# Patient Record
Sex: FEMALE | Race: BLACK OR AFRICAN AMERICAN | NOT HISPANIC OR LATINO | Employment: FULL TIME | URBAN - METROPOLITAN AREA
[De-identification: names, ages, dates, MRNs, and addresses within clinical notes are randomized per-mention and may not be internally consistent; named-entity substitution may affect disease eponyms.]

---

## 2017-11-30 ENCOUNTER — HOSPITAL ENCOUNTER (EMERGENCY)
Facility: HOSPITAL | Age: 22
Discharge: HOME | End: 2017-11-30
Attending: EMERGENCY MEDICINE
Payer: OTHER GOVERNMENT

## 2017-11-30 VITALS
BODY MASS INDEX: 30.58 KG/M2 | OXYGEN SATURATION: 100 % | RESPIRATION RATE: 18 BRPM | SYSTOLIC BLOOD PRESSURE: 118 MMHG | DIASTOLIC BLOOD PRESSURE: 59 MMHG | HEART RATE: 67 BPM | TEMPERATURE: 98 F | HEIGHT: 61 IN | WEIGHT: 162 LBS

## 2017-11-30 DIAGNOSIS — N93.8 DYSFUNCTIONAL UTERINE BLEEDING: ICD-10-CM

## 2017-11-30 DIAGNOSIS — R51.9 NONINTRACTABLE HEADACHE, UNSPECIFIED CHRONICITY PATTERN, UNSPECIFIED HEADACHE TYPE: Primary | ICD-10-CM

## 2017-11-30 LAB
ALBUMIN SERPL BCP-MCNC: 3.6 G/DL
ALP SERPL-CCNC: 52 U/L
ALT SERPL W/O P-5'-P-CCNC: 13 U/L
ANION GAP SERPL CALC-SCNC: 6 MMOL/L
AST SERPL-CCNC: 20 U/L
B-HCG UR QL: NEGATIVE
BASOPHILS # BLD AUTO: 0.02 K/UL
BASOPHILS NFR BLD: 0.4 %
BILIRUB SERPL-MCNC: 0.6 MG/DL
BILIRUB UR QL STRIP: NEGATIVE
BUN SERPL-MCNC: 5 MG/DL
CALCIUM SERPL-MCNC: 9.3 MG/DL
CHLORIDE SERPL-SCNC: 109 MMOL/L
CLARITY UR: CLEAR
CO2 SERPL-SCNC: 26 MMOL/L
COLOR UR: ABNORMAL
CREAT SERPL-MCNC: 0.8 MG/DL
CTP QC/QA: YES
DIFFERENTIAL METHOD: ABNORMAL
EOSINOPHIL # BLD AUTO: 0.2 K/UL
EOSINOPHIL NFR BLD: 3.8 %
ERYTHROCYTE [DISTWIDTH] IN BLOOD BY AUTOMATED COUNT: 12.5 %
EST. GFR  (AFRICAN AMERICAN): >60 ML/MIN/1.73 M^2
EST. GFR  (NON AFRICAN AMERICAN): >60 ML/MIN/1.73 M^2
GLUCOSE SERPL-MCNC: 98 MG/DL
GLUCOSE UR QL STRIP: NEGATIVE
HCT VFR BLD AUTO: 28.1 %
HGB BLD-MCNC: 9.7 G/DL
HGB UR QL STRIP: ABNORMAL
KETONES UR QL STRIP: NEGATIVE
LEUKOCYTE ESTERASE UR QL STRIP: NEGATIVE
LYMPHOCYTES # BLD AUTO: 1.7 K/UL
LYMPHOCYTES NFR BLD: 33.7 %
MCH RBC QN AUTO: 31.6 PG
MCHC RBC AUTO-ENTMCNC: 34.5 G/DL
MCV RBC AUTO: 92 FL
MICROSCOPIC COMMENT: ABNORMAL
MONOCYTES # BLD AUTO: 0.3 K/UL
MONOCYTES NFR BLD: 6.8 %
NEUTROPHILS # BLD AUTO: 2.8 K/UL
NEUTROPHILS NFR BLD: 55.3 %
NITRITE UR QL STRIP: NEGATIVE
PH UR STRIP: 8 [PH] (ref 5–8)
PLATELET # BLD AUTO: 230 K/UL
PMV BLD AUTO: 9.4 FL
POTASSIUM SERPL-SCNC: 4.4 MMOL/L
PROT SERPL-MCNC: 6.6 G/DL
PROT UR QL STRIP: NEGATIVE
RBC # BLD AUTO: 3.07 M/UL
RBC #/AREA URNS HPF: 12 /HPF (ref 0–4)
SODIUM SERPL-SCNC: 141 MMOL/L
SP GR UR STRIP: 1 (ref 1–1.03)
SQUAMOUS #/AREA URNS HPF: 1 /HPF
URN SPEC COLLECT METH UR: ABNORMAL
UROBILINOGEN UR STRIP-ACNC: NEGATIVE EU/DL
WBC # BLD AUTO: 4.98 K/UL

## 2017-11-30 PROCEDURE — 81025 URINE PREGNANCY TEST: CPT | Performed by: EMERGENCY MEDICINE

## 2017-11-30 PROCEDURE — 99284 EMERGENCY DEPT VISIT MOD MDM: CPT

## 2017-11-30 PROCEDURE — 81000 URINALYSIS NONAUTO W/SCOPE: CPT

## 2017-11-30 PROCEDURE — 80053 COMPREHEN METABOLIC PANEL: CPT

## 2017-11-30 PROCEDURE — 85025 COMPLETE CBC W/AUTO DIFF WBC: CPT

## 2017-11-30 RX ORDER — BUTALBITAL, ACETAMINOPHEN AND CAFFEINE 50; 325; 40 MG/1; MG/1; MG/1
1 TABLET ORAL EVERY 6 HOURS PRN
Qty: 12 TABLET | Refills: 0 | Status: SHIPPED | OUTPATIENT
Start: 2017-11-30 | End: 2017-12-30

## 2017-11-30 NOTE — ED TRIAGE NOTES
Reports vag bleeding since 11/16/17. C/o lt. Eye pain, nausea  X 4 day. Reports blurred vision.  Tylenol 500mg taken 12 noon with no relief.

## 2017-12-01 NOTE — ED PROVIDER NOTES
Encounter Date: 11/30/2017       History     Chief Complaint   Patient presents with    Headache     behind left eye - throbbing in nature.     LMP started 11/16 remains present and heavy.  +nauseated x 1 week.     Chief complaint: Headache and vaginal bleeding    History of present illness: Patient is 22-year-old female who reports pain in her head behind her left eye that is intermittent and throbbing.  She states the Tylenol ES was not effective for this pain.  Worse in the morning current severity pain is 9/10.  She reports associated nausea.  Also states that her LMP was 11/16/17 she reports continuous vaginal bleeding since then.  He reports approximately 2 pads bled through today.      The history is provided by the patient. No  was used.     Review of patient's allergies indicates:  No Known Allergies  History reviewed. No pertinent past medical history.  Past Surgical History:   Procedure Laterality Date    KELOID EXCISION       History reviewed. No pertinent family history.  Social History   Substance Use Topics    Smoking status: Never Smoker    Smokeless tobacco: Never Used    Alcohol use Yes      Comment: occ     Review of Systems   Constitutional: Negative for chills, fatigue and fever.   HENT: Negative for congestion, ear discharge, ear pain, postnasal drip, rhinorrhea, sinus pressure, sneezing, sore throat and voice change.    Eyes: Negative for discharge and itching.   Respiratory: Negative for cough, shortness of breath and wheezing.    Cardiovascular: Negative for chest pain, palpitations and leg swelling.   Gastrointestinal: Negative for abdominal pain, constipation, diarrhea, nausea and vomiting.   Endocrine: Negative for polydipsia, polyphagia and polyuria.   Genitourinary: Positive for vaginal bleeding. Negative for dysuria, frequency, hematuria, urgency, vaginal discharge and vaginal pain.   Musculoskeletal: Negative for arthralgias and myalgias.   Skin: Negative for  rash and wound.   Neurological: Positive for headaches. Negative for dizziness, seizures, syncope, weakness and numbness.        Photo/phonophobia   Hematological: Negative for adenopathy. Does not bruise/bleed easily.   Psychiatric/Behavioral: Negative for self-injury and suicidal ideas. The patient is not nervous/anxious.        Physical Exam     Initial Vitals [11/30/17 1528]   BP Pulse Resp Temp SpO2   118/68 83 16 98.4 °F (36.9 °C) 100 %      MAP       84.67         Physical Exam    Nursing note and vitals reviewed.  Constitutional: She appears well-developed and well-nourished.   HENT:   Head: Normocephalic and atraumatic.   Right Ear: External ear normal.   Left Ear: External ear normal.   Nose: Nose normal. No epistaxis.   Mouth/Throat: Oropharynx is clear and moist.   Eyes: Conjunctivae and EOM are normal. Pupils are equal, round, and reactive to light. Right eye exhibits no discharge. Left eye exhibits no discharge.   Neck: Normal range of motion.   Abdominal: Soft. Normal appearance and bowel sounds are normal. She exhibits no distension. There is no tenderness.   Musculoskeletal: Normal range of motion.   Neurological: She is alert and oriented to person, place, and time. She has normal strength. No cranial nerve deficit or sensory deficit. She exhibits normal muscle tone. She displays a negative Romberg sign. Coordination and gait normal. GCS eye subscore is 4. GCS verbal subscore is 5. GCS motor subscore is 6.   Equal  strength bilaterally, equal bicep flexion and tricep extension strength, leg extension and flexion strength appropriate and equal, foot plantar- and dorsi-flexion equal and appropriate   Skin: Skin is dry. Capillary refill takes less than 2 seconds.         ED Course   Procedures  Labs Reviewed   CBC W/ AUTO DIFFERENTIAL - Abnormal; Notable for the following:        Result Value    RBC 3.07 (*)     Hemoglobin 9.7 (*)     Hematocrit 28.1 (*)     MCH 31.6 (*)     All other components  within normal limits   COMPREHENSIVE METABOLIC PANEL - Abnormal; Notable for the following:     BUN, Bld 5 (*)     Alkaline Phosphatase 52 (*)     Anion Gap 6 (*)     All other components within normal limits   URINALYSIS - Abnormal; Notable for the following:     Occult Blood UA 2+ (*)     All other components within normal limits   URINALYSIS MICROSCOPIC - Abnormal; Notable for the following:     RBC, UA 12 (*)     All other components within normal limits   POCT URINE PREGNANCY             Medical Decision Making:   Differential Diagnosis:   Differential diagnosis includes but is not limited to meningitis, encephalitis, herpes zoster, subarachnoid hemorrhage, venous sinus thrombosis, glaucoma, migraine, tension headache, trigeminal neuralgia, TMJ pain.  There was no nuchal rigidity to suggest meningitis, encephalitis.  No rashes suggest herpes zoster.  CT was negative for all edge cranial abnormality including subarachnoid hemorrhage.  There was no trauma to suggest venous sinus thrombosis.  No visual disturbances to suggest glaucoma.  Headache did not fit the profile of a tension headache.  Facial pain was not present so I doubt trigeminal neuralgia.  There is no pain with opening or closing the muscle doubt TMJ pain.                   ED Course as of Nov 30 1936   Thu Nov 30, 2017   1636 Preg Test, Ur: Negative [VC]   1636 BP: 118/68 [VC]   1636 Temp: 98.4 °F (36.9 °C) [VC]   1636 Pulse: 83 [VC]   1636 Resp: 16 [VC]   1636 Triage note reviewed.  VS normal.   SpO2: 100 % [VC]   1636 Initial assessment: 22-year-old female complains of headache behind the left eye that is intermittent and throbbing.  Tylenol does not affect his headache is worse in the morning current severity pain is 9/10.  She has associated nausea without vomiting, reported photophobia and phonophobia.  Patient also reports uterine bleeding since her period began on November 16, 2017.  She reports bleeding through 2 pads today.Plan: CBC,  chemistry, urinalysis, CT of the head, UPT.Plan discussed with Dr. Garber who concurs.  [VC]   1639 Awaiting intake exam by nurse.  [VC]   1747 CBC: leukocyte count was normal, the H&H was reduced. The platelet count was normal. This indicates anemia    [VC]   1758 Occult blood in urine is due to patient's vaginal bleeding.  [VC]   1826 The chemistry was negative for hypo-or hyper natremia, kalemia, chloridemia, or other electrolyte abnormalities; BUN and creatinine were within normal limits indicating normal kidney function, ALT and AST were within normal limits indicating normal liver function, there was no transaminitis.    [VC]   1826 No acute intracranial abnormality identified.  Specifically, no intracranial hemorrhage.  [VC]   1826 Pt will be d/c'ed to home with rx for fioricet and instructions to f/u with neuro and gyn asap.  [VC]      ED Course User Index  [VC] Rupesh Ho DNP     Clinical Impression:   The primary encounter diagnosis was Nonintractable headache, unspecified chronicity pattern, unspecified headache type. A diagnosis of Dysfunctional uterine bleeding was also pertinent to this visit.    Disposition:   Disposition: Discharged  Condition: Stable                        Rupesh Ho DNP  11/30/17 1939

## 2017-12-01 NOTE — DISCHARGE INSTRUCTIONS
You have been given a medication that may cause drowsiness, do not drive or operate heavy machinery with this medication.  Return to the Emergency department for any worsening or failure to improve, otherwise follow up with your primary care provider.

## 2017-12-05 ENCOUNTER — OFFICE VISIT (OUTPATIENT)
Dept: OBSTETRICS AND GYNECOLOGY | Facility: CLINIC | Age: 22
End: 2017-12-05
Payer: OTHER GOVERNMENT

## 2017-12-05 ENCOUNTER — LAB VISIT (OUTPATIENT)
Dept: LAB | Facility: OTHER | Age: 22
End: 2017-12-05
Attending: OBSTETRICS & GYNECOLOGY
Payer: OTHER GOVERNMENT

## 2017-12-05 VITALS
SYSTOLIC BLOOD PRESSURE: 100 MMHG | WEIGHT: 167.56 LBS | HEIGHT: 61 IN | DIASTOLIC BLOOD PRESSURE: 62 MMHG | BODY MASS INDEX: 31.63 KG/M2

## 2017-12-05 DIAGNOSIS — N93.8 DUB (DYSFUNCTIONAL UTERINE BLEEDING): ICD-10-CM

## 2017-12-05 DIAGNOSIS — Z01.411 ENCOUNTER FOR GYNECOLOGICAL EXAMINATION WITH ABNORMAL FINDING: ICD-10-CM

## 2017-12-05 DIAGNOSIS — Z01.419 PAP SMEAR, AS PART OF ROUTINE GYNECOLOGICAL EXAMINATION: ICD-10-CM

## 2017-12-05 DIAGNOSIS — Z01.411 ENCOUNTER FOR GYNECOLOGICAL EXAMINATION WITH ABNORMAL FINDING: Primary | ICD-10-CM

## 2017-12-05 LAB
B-HCG UR QL: NEGATIVE
CTP QC/QA: YES
TSH SERPL DL<=0.005 MIU/L-ACNC: 0.5 UIU/ML

## 2017-12-05 PROCEDURE — 36415 COLL VENOUS BLD VENIPUNCTURE: CPT

## 2017-12-05 PROCEDURE — 81025 URINE PREGNANCY TEST: CPT | Mod: PBBFAC | Performed by: OBSTETRICS & GYNECOLOGY

## 2017-12-05 PROCEDURE — 99212 OFFICE O/P EST SF 10 MIN: CPT | Mod: PBBFAC | Performed by: OBSTETRICS & GYNECOLOGY

## 2017-12-05 PROCEDURE — 84443 ASSAY THYROID STIM HORMONE: CPT

## 2017-12-05 PROCEDURE — 99385 PREV VISIT NEW AGE 18-39: CPT | Mod: S$PBB,,, | Performed by: OBSTETRICS & GYNECOLOGY

## 2017-12-05 PROCEDURE — 88175 CYTOPATH C/V AUTO FLUID REDO: CPT | Performed by: PATHOLOGY

## 2017-12-05 PROCEDURE — 99999 PR PBB SHADOW E&M-EST. PATIENT-LVL II: CPT | Mod: PBBFAC,,, | Performed by: OBSTETRICS & GYNECOLOGY

## 2017-12-05 PROCEDURE — 88141 CYTOPATH C/V INTERPRET: CPT | Mod: ,,, | Performed by: PATHOLOGY

## 2017-12-05 NOTE — LETTER
December 8, 2017      Michelle Haddad, PARTHA  [address] [address]           [facility] [address]  [address] [facility] [address]  Phone: [phone]          Patient: Serge Rodriguez   MR Number: [id number]   YOB: 1995   Date of Visit: 12/5/2017       Dear Michelle Haddad:    Thank you for referring Serge Rodriguez to me for evaluation. Attached you will find relevant portions of my assessment and plan of care.    If you have questions, please do not hesitate to call me. I look forward to following Serge Rodriguez along with you.    Sincerely,    Emmett Gamble MD    Enclosure  CC:  No Recipients    If you would like to receive this communication electronically, please contact [email or web address] or [phone] to request more information on [employer] access.    For providers and/or their staff who would like to refer a patient to [facility], please contact us through our one-stop-shop provider referral line, [facility]ager, at [phone].    If you feel you have received this communication in error or would no longer like to receive these types of communications, please e-mail [email or web address]

## 2017-12-08 NOTE — PROGRESS NOTES
Subjective:       Patient ID: Serge Rodriguez is a 22 y.o. female.    Chief Complaint:  Metrorrhagia (Patient reports no period x 3 month.) and Migraine      History of Present Illness  HPI  Pt is 22 y.o. with Patient's last menstrual period was 2017 (exact date). who reports that for the last 3 months, her cycles have been irregular.  Also having migraines.  Not exactly related to her menses.  Does not want to be on contraceptive at this time.    GYN & OB History  Patient's last menstrual period was 2017 (exact date).   Date of Last Pap: 2017    OB History    Para Term  AB Living   1       1     SAB TAB Ectopic Multiple Live Births                  # Outcome Date GA Lbr Andrez/2nd Weight Sex Delivery Anes PTL Lv   1 AB  20w0d       FD          Review of Systems  Review of Systems   Constitutional: Negative for activity change, appetite change and fatigue.   HENT: Negative.  Negative for tinnitus.    Eyes: Negative.    Respiratory: Negative for cough and shortness of breath.    Cardiovascular: Negative for chest pain and palpitations.   Gastrointestinal: Negative.  Negative for abdominal pain, blood in stool, constipation, diarrhea and nausea.   Endocrine: Negative.  Negative for hot flashes.   Genitourinary: Negative for dyspareunia, dysuria, frequency, menstrual problem, pelvic pain, vaginal discharge, dysmenorrhea, urinary incontinence and postcoital bleeding.   Musculoskeletal: Negative for back pain and joint swelling.   Skin:  Negative for rash.   Neurological: Negative.  Negative for headaches.   Hematological: Negative.  Does not bruise/bleed easily.   Psychiatric/Behavioral: The patient is not nervous/anxious.    Breast: negative.  Negative for breast mass, nipple discharge and skin changes          Objective:    Physical Exam:   Constitutional: Vital signs are normal. She appears well-developed and well-nourished. She is active and cooperative. She does not appear ill. No  distress.    HENT:   Head: Normocephalic and atraumatic.   Nose: Nose normal.   Mouth/Throat: Oropharynx is clear and moist and mucous membranes are normal.    Eyes: Conjunctivae, EOM and lids are normal. Pupils are equal, round, and reactive to light.    Neck: Full passive range of motion without pain. No muscular tenderness present. No neck rigidity. No thyroid mass and no thyromegaly present.    Cardiovascular: Normal rate, regular rhythm, S1 normal, S2 normal, normal heart sounds and normal pulses.     Pulmonary/Chest: Effort normal and breath sounds normal. No accessory muscle usage. Right breast exhibits no inverted nipple, no mass, no nipple discharge, no skin change, no tenderness and no swelling. Left breast exhibits no inverted nipple, no mass, no nipple discharge, no skin change, no tenderness and no swelling.        Abdominal: Soft. Normal appearance and bowel sounds are normal. She exhibits no distension, no ascites and no mass. There is no hepatosplenomegaly. There is no tenderness. There is no rigidity, no rebound and no guarding.     Genitourinary: Vagina normal and uterus normal. Pelvic exam was performed with patient supine. There is no tenderness or injury on the right labia. There is no tenderness or injury on the left labia. Uterus is not deviated, not hosting fibroids and not experiencing uterine prolapse. Cervix is normal. Right adnexum displays no mass and no fullness. Left adnexum displays no mass and no fullness. No erythema, rectocele or cystocele in the vagina. No vaginal discharge found. Labial bartholins normal.Cervix exhibits no motion tenderness and no discharge.              Lymphadenopathy:        Right: No inguinal adenopathy present.        Left: No inguinal adenopathy present.    Neurological: She is alert. She has normal strength.    Skin: Skin is warm, dry and intact.    Psychiatric: She has a normal mood and affect. Her behavior is normal. Thought content normal. Her mood  appears not anxious. Her affect is not inappropriate. Her speech is not slurred. She is not agitated and not aggressive. Thought content is not paranoid. Cognition and memory are normal. She expresses no suicidal plans and no homicidal plans.          Assessment:        1. Encounter for gynecological examination with abnormal finding    2. Pap smear, as part of routine gynecological examination    3. DUB (dysfunctional uterine bleeding)                Plan:      Tashea was seen today for metrorrhagia and migraine.    Diagnoses and all orders for this visit:    Encounter for gynecological examination with abnormal finding  -     POCT urine pregnancy  -     Liquid-based pap smear, screening  -     TSH; Future    Pap smear, as part of routine gynecological examination  -     Liquid-based pap smear, screening  Likely AUB-O.    DUB (dysfunctional uterine bleeding)  -     POCT urine pregnancy  -     TSH; Future

## 2017-12-12 ENCOUNTER — TELEPHONE (OUTPATIENT)
Dept: OBSTETRICS AND GYNECOLOGY | Facility: HOSPITAL | Age: 22
End: 2017-12-12

## 2017-12-12 NOTE — TELEPHONE ENCOUNTER
Patient was notified of results and states she is still bleeding at this time and will schedule follow up through [facility] at a later time.

## 2018-03-22 ENCOUNTER — OFFICE VISIT (OUTPATIENT)
Dept: OBSTETRICS AND GYNECOLOGY | Facility: CLINIC | Age: 23
End: 2018-03-22
Payer: OTHER GOVERNMENT

## 2018-03-22 VITALS
BODY MASS INDEX: 30.93 KG/M2 | DIASTOLIC BLOOD PRESSURE: 62 MMHG | SYSTOLIC BLOOD PRESSURE: 116 MMHG | WEIGHT: 163.81 LBS | HEIGHT: 61 IN

## 2018-03-22 DIAGNOSIS — N91.4 SECONDARY OLIGOMENORRHEA: Primary | ICD-10-CM

## 2018-03-22 DIAGNOSIS — R87.615 UNSATISFACTORY CERVICAL PAPANICOLAOU SMEAR: ICD-10-CM

## 2018-03-22 PROCEDURE — 87624 HPV HI-RISK TYP POOLED RSLT: CPT

## 2018-03-22 PROCEDURE — 99213 OFFICE O/P EST LOW 20 MIN: CPT | Mod: S$PBB,,, | Performed by: OBSTETRICS & GYNECOLOGY

## 2018-03-22 PROCEDURE — 99999 PR PBB SHADOW E&M-EST. PATIENT-LVL II: CPT | Mod: PBBFAC,,, | Performed by: OBSTETRICS & GYNECOLOGY

## 2018-03-22 PROCEDURE — 99212 OFFICE O/P EST SF 10 MIN: CPT | Mod: PBBFAC | Performed by: OBSTETRICS & GYNECOLOGY

## 2018-03-22 PROCEDURE — 88175 CYTOPATH C/V AUTO FLUID REDO: CPT | Performed by: PATHOLOGY

## 2018-03-22 PROCEDURE — 88141 CYTOPATH C/V INTERPRET: CPT | Mod: ,,, | Performed by: PATHOLOGY

## 2018-03-22 NOTE — PROGRESS NOTES
Subjective:       Patient ID: Serge Loera is a 23 y.o. female.    Chief Complaint:  Abnormal Pap Smear      History of Present Illness  HPI  Pt is 23 y.o. here for 2 issues:  1)  Unsatisfactory pap -- last pap was unsat.  No hx of abnl pap  2)  Oligomenorrhea -- no tx started at last visit.  Nl tsh.  Still has irregular cycles.  Is sexually active and does not need contraception.    GYN & OB History  Patient's last menstrual period was 2018 (approximate).   Date of Last Pap: 2017    OB History    Para Term  AB Living   1       1     SAB TAB Ectopic Multiple Live Births                  # Outcome Date GA Lbr Andrez/2nd Weight Sex Delivery Anes PTL Lv   1 AB  20w0d       FD          Review of Systems  Review of Systems   Constitutional: Negative for activity change, appetite change and fatigue.   HENT: Negative.  Negative for tinnitus.    Eyes: Negative.    Respiratory: Negative for cough and shortness of breath.    Cardiovascular: Negative for chest pain and palpitations.   Gastrointestinal: Negative.  Negative for abdominal pain, blood in stool, constipation, diarrhea and nausea.   Endocrine: Negative.  Negative for hot flashes.   Genitourinary: Positive for menstrual problem. Negative for dyspareunia, dysuria, frequency, pelvic pain, vaginal discharge, dysmenorrhea, urinary incontinence and postcoital bleeding.   Musculoskeletal: Negative for back pain and joint swelling.   Skin:  Negative for rash.   Neurological: Negative.  Negative for headaches.   Hematological: Negative.  Does not bruise/bleed easily.   Psychiatric/Behavioral: The patient is not nervous/anxious.    Breast: negative.  Negative for breast mass, nipple discharge and skin changes          Objective:    Physical Exam:   Constitutional: She is oriented to person, place, and time. She appears well-developed and well-nourished. No distress.    HENT:   Head: Normocephalic and atraumatic.    Eyes: Conjunctivae and lids  are normal. Pupils are equal, round, and reactive to light.    Neck: Normal range of motion and full passive range of motion without pain. Neck supple.    Cardiovascular: Normal rate and regular rhythm.  Exam reveals no edema.     Pulmonary/Chest: Effort normal and breath sounds normal. She has no wheezes.        Abdominal: Soft. Normal appearance and bowel sounds are normal. She exhibits no distension. There is no tenderness. There is no rebound and no guarding.     Genitourinary: Vagina normal and uterus normal. Pelvic exam was performed with patient supine. There is no tenderness or lesion on the right labia. There is no tenderness or lesion on the left labia. Uterus is not deviated, not fixed and not hosting fibroids. Cervix is normal. Right adnexum displays no mass and no tenderness. Left adnexum displays no mass and no tenderness. No rectocele, cystocele or unspecified prolapse of vaginal walls in the vagina. No vaginal discharge found. Labial bartholins normal.Cervix exhibits no motion tenderness and no discharge.           Musculoskeletal: Normal range of motion and moves all extremeties.       Neurological: She is alert and oriented to person, place, and time. She has normal strength.    Skin: Skin is warm and dry.    Psychiatric: She has a normal mood and affect. Her speech is normal and behavior is normal. Thought content normal. Her mood appears not anxious. She does not exhibit a depressed mood. She expresses no suicidal plans and no homicidal plans.          Assessment:        1. Secondary oligomenorrhea    2. Unsatisfactory cervical Papanicolaou smear                Plan:      Serge was seen today for abnormal pap smear.    Diagnoses and all orders for this visit:    Secondary oligomenorrhea  -     Liquid-based pap smear, screening  Discussed the different reasons for oligomenorrhea.  She was likely amenorrhea.  We discussed the importance of exercise and diet to help maintain weight.  Offered other  pills and patient declines at this time.  Unsatisfactory cervical Papanicolaou smear  -     Liquid-based pap smear, screening  Cervix grossly normal.  Adequate sample obtained.

## 2018-04-02 ENCOUNTER — TELEPHONE (OUTPATIENT)
Dept: OBSTETRICS AND GYNECOLOGY | Facility: CLINIC | Age: 23
End: 2018-04-02

## 2018-04-02 NOTE — TELEPHONE ENCOUNTER
----- Message from Maikel Alan sent at 4/2/2018 12:05 PM CDT -----  Contact: Pt  Name of Who is Calling: GREGORY NELSON [[id number]]      What is the request in detail: Patient would Iike to speak with someone to explain her test results      Can the clinic reply by [facility]: yes      [facility]: [phone]

## 2018-04-02 NOTE — TELEPHONE ENCOUNTER
Pt was calling regarding her pap smear test results.  Pt was given her results Per Shweta Pham:    The results of your most recent Pap smear was ASCUS (mild abnormal cells). We recommend that you come back in 1 year for your annual exam and pap smear.    Pt stated she will be moving to [place] by next year.  Pt was instructed when she moves to select a new GYN, and to have her records transferred to the New  Office.    Pt verbalized understanding and thanked me for call.

## 2018-04-03 LAB
HPV16 AG SPEC QL: NEGATIVE
HPV16+18+H RISK 12 DNA CVX-IMP: POSITIVE
HPV18 DNA SPEC QL NAA+PROBE: NEGATIVE

## 2018-05-22 ENCOUNTER — TELEPHONE (OUTPATIENT)
Dept: OBSTETRICS AND GYNECOLOGY | Facility: CLINIC | Age: 23
End: 2018-05-22

## 2018-05-22 DIAGNOSIS — Z34.90 PREGNANCY, UNSPECIFIED GESTATIONAL AGE: Primary | ICD-10-CM

## 2018-05-22 NOTE — TELEPHONE ENCOUNTER
Spoke with Pt  Scheduled pt's amenorrhea appt with Dr Gamble and dating U/S to follow.    Pt verbalized understanding and thanked me for call.

## 2018-05-22 NOTE — TELEPHONE ENCOUNTER
----- Message from Betty Hillman sent at 5/22/2018 11:13 AM CDT -----  Contact: pt      Can the clinic reply in [facility]: yes    Who Called: GREGORY NELSON [[id number]]    Date of Positive Preg Test: 5/21    1st day of Last Menstrual Cycle: 3/29    List Any Difficulties: no      What Number to Call Back: [phone]

## 2018-05-22 NOTE — TELEPHONE ENCOUNTER
----- Message from Arely Garcia sent at 5/22/2018  3:43 PM CDT -----  Contact: Serge   Name of Who is Calling: Serge      What is the request in detail: Patient was returning a call back to schedule her initial ob appointment       Can the clinic reply by [facility]: No       What Number to Call Back if not in [facility]: [phone]

## 2018-05-30 ENCOUNTER — TELEPHONE (OUTPATIENT)
Dept: OBSTETRICS AND GYNECOLOGY | Facility: CLINIC | Age: 23
End: 2018-05-30

## 2018-05-30 NOTE — TELEPHONE ENCOUNTER
Spoke with pt;    Pt is experiencing very bad morning sickness and would like to come in sooner than next week to see Dr. Gamble.    Instructed pt to eat crackers, avoid sudden movements, eat small frequent meals, and stay hydrated using water, sprite, or ginger ale.  Taking small sips if necessary.    Pt verblized understanding and thanked me for changing her appt.    Pt was rescheduled to see Dr. Gamble tomorrow at 8:30 am.

## 2018-05-30 NOTE — TELEPHONE ENCOUNTER
----- Message from Marisela Colin sent at 5/30/2018 12:03 PM CDT -----  Contact: GREGORY NELSON [[id number]]            Name of Who is Calling:GREGORY NELSON [[id number]]      What is the request in detail: pt would like to know if she can get her appointment moved up sooner due to her morning sickness      Can the clinic reply by [facility]: NO      What Number to Call Back if not in [facility]:[phone]

## 2018-05-31 ENCOUNTER — OFFICE VISIT (OUTPATIENT)
Dept: OBSTETRICS AND GYNECOLOGY | Facility: CLINIC | Age: 23
End: 2018-05-31
Payer: OTHER GOVERNMENT

## 2018-05-31 ENCOUNTER — LAB VISIT (OUTPATIENT)
Dept: LAB | Facility: OTHER | Age: 23
End: 2018-05-31
Attending: OBSTETRICS & GYNECOLOGY
Payer: OTHER GOVERNMENT

## 2018-05-31 VITALS
DIASTOLIC BLOOD PRESSURE: 60 MMHG | HEIGHT: 61 IN | BODY MASS INDEX: 28.72 KG/M2 | WEIGHT: 152.13 LBS | SYSTOLIC BLOOD PRESSURE: 100 MMHG

## 2018-05-31 DIAGNOSIS — N91.2 AMENORRHEA: ICD-10-CM

## 2018-05-31 DIAGNOSIS — R11.0 NAUSEA: ICD-10-CM

## 2018-05-31 DIAGNOSIS — Z34.90 PREGNANCY, UNSPECIFIED GESTATIONAL AGE: ICD-10-CM

## 2018-05-31 DIAGNOSIS — N91.2 AMENORRHEA: Primary | ICD-10-CM

## 2018-05-31 DIAGNOSIS — O09.891 HISTORY OF PRETERM DELIVERY, CURRENTLY PREGNANT IN FIRST TRIMESTER: ICD-10-CM

## 2018-05-31 LAB
ABO + RH BLD: NORMAL
B-HCG UR QL: NEGATIVE
BASOPHILS # BLD AUTO: 0.01 K/UL
BASOPHILS NFR BLD: 0.2 %
BLD GP AB SCN CELLS X3 SERPL QL: NORMAL
C TRACH DNA SPEC QL NAA+PROBE: NOT DETECTED
CTP QC/QA: YES
DIFFERENTIAL METHOD: ABNORMAL
EOSINOPHIL # BLD AUTO: 0.1 K/UL
EOSINOPHIL NFR BLD: 0.9 %
ERYTHROCYTE [DISTWIDTH] IN BLOOD BY AUTOMATED COUNT: 18.3 %
HCT VFR BLD AUTO: 33.4 %
HGB BLD-MCNC: 10.4 G/DL
LYMPHOCYTES # BLD AUTO: 1.3 K/UL
LYMPHOCYTES NFR BLD: 22.5 %
MCH RBC QN AUTO: 25.6 PG
MCHC RBC AUTO-ENTMCNC: 31.1 G/DL
MCV RBC AUTO: 82 FL
MONOCYTES # BLD AUTO: 0.4 K/UL
MONOCYTES NFR BLD: 7 %
N GONORRHOEA DNA SPEC QL NAA+PROBE: NOT DETECTED
NEUTROPHILS # BLD AUTO: 3.9 K/UL
NEUTROPHILS NFR BLD: 69.2 %
PLATELET # BLD AUTO: 311 K/UL
PMV BLD AUTO: 9.6 FL
RBC # BLD AUTO: 4.07 M/UL
RPR SER QL: NORMAL
WBC # BLD AUTO: 5.69 K/UL

## 2018-05-31 PROCEDURE — 76801 OB US < 14 WKS SINGLE FETUS: CPT | Mod: 26,S$PBB,, | Performed by: OBSTETRICS & GYNECOLOGY

## 2018-05-31 PROCEDURE — 87086 URINE CULTURE/COLONY COUNT: CPT

## 2018-05-31 PROCEDURE — 99214 OFFICE O/P EST MOD 30 MIN: CPT | Mod: S$PBB,,, | Performed by: OBSTETRICS & GYNECOLOGY

## 2018-05-31 PROCEDURE — 86850 RBC ANTIBODY SCREEN: CPT

## 2018-05-31 PROCEDURE — 87340 HEPATITIS B SURFACE AG IA: CPT

## 2018-05-31 PROCEDURE — 36415 COLL VENOUS BLD VENIPUNCTURE: CPT

## 2018-05-31 PROCEDURE — 86592 SYPHILIS TEST NON-TREP QUAL: CPT

## 2018-05-31 PROCEDURE — 85025 COMPLETE CBC W/AUTO DIFF WBC: CPT

## 2018-05-31 PROCEDURE — 86703 HIV-1/HIV-2 1 RESULT ANTBDY: CPT

## 2018-05-31 PROCEDURE — 86762 RUBELLA ANTIBODY: CPT

## 2018-05-31 PROCEDURE — 76801 OB US < 14 WKS SINGLE FETUS: CPT | Mod: PBBFAC | Performed by: OBSTETRICS & GYNECOLOGY

## 2018-05-31 PROCEDURE — 99999 PR PBB SHADOW E&M-EST. PATIENT-LVL III: CPT | Mod: PBBFAC,,, | Performed by: OBSTETRICS & GYNECOLOGY

## 2018-05-31 PROCEDURE — 83020 HEMOGLOBIN ELECTROPHORESIS: CPT

## 2018-05-31 PROCEDURE — 81025 URINE PREGNANCY TEST: CPT | Mod: PBBFAC | Performed by: OBSTETRICS & GYNECOLOGY

## 2018-05-31 PROCEDURE — 87491 CHLMYD TRACH DNA AMP PROBE: CPT

## 2018-05-31 PROCEDURE — 99213 OFFICE O/P EST LOW 20 MIN: CPT | Mod: PBBFAC,25 | Performed by: OBSTETRICS & GYNECOLOGY

## 2018-06-01 LAB
BACTERIA UR CULT: NO GROWTH
HBV SURFACE AG SERPL QL IA: NEGATIVE
HGB A2 MFR BLD HPLC: 2 %
HGB FRACT BLD ELPH-IMP: ABNORMAL
HGB FRACT BLD ELPH-IMP: NORMAL
HIV 1+2 AB+HIV1 P24 AG SERPL QL IA: NEGATIVE
RUBV IGG SER-ACNC: 27.9 IU/ML
RUBV IGG SER-IMP: REACTIVE

## 2018-06-04 ENCOUNTER — TELEPHONE (OUTPATIENT)
Dept: OBSTETRICS AND GYNECOLOGY | Facility: CLINIC | Age: 23
End: 2018-06-04

## 2018-06-04 NOTE — PROGRESS NOTES
Subjective:       Patient ID: Serge Loera is a 23 y.o. female.    Chief Complaint:  Amenorrhea (EGA: 9 weeks) and Morning Sickness      History of Present Illness  HPI  Missed Menses/ Possible Pregnancy  Patient complains of amenorrhea. She believes she could be pregnant. Pregnancy is desired. Sexual Activity: single partner, contraception: none. Current symptoms also include: morning sickness. Last period was normal.     Patient's last menstrual period was 2018 (exact date).     GYN & OB History  Patient's last menstrual period was 2018 (exact date).   Date of Last Pap: 3/29/2018    OB History    Para Term  AB Living   1       1     SAB TAB Ectopic Multiple Live Births                  # Outcome Date GA Lbr Andrez/2nd Weight Sex Delivery Anes PTL Lv   1 AB 11/04/15 20w0d   M   Y FD          Review of Systems  Review of Systems   Constitutional: Negative for activity change, appetite change and fatigue.   HENT: Negative.  Negative for tinnitus.    Eyes: Negative.    Respiratory: Negative for cough and shortness of breath.    Cardiovascular: Negative for chest pain and palpitations.   Gastrointestinal: Positive for nausea. Negative for abdominal pain, blood in stool, constipation and diarrhea.   Endocrine: Negative.  Negative for hot flashes.   Genitourinary: Negative for dyspareunia, dysuria, frequency, menstrual problem, pelvic pain, vaginal discharge, dysmenorrhea, urinary incontinence and postcoital bleeding.   Musculoskeletal: Negative for back pain and joint swelling.   Skin:  Negative for rash.   Neurological: Negative.  Negative for headaches.   Hematological: Negative.  Does not bruise/bleed easily.   Psychiatric/Behavioral: The patient is not nervous/anxious.    Breast: negative.  Negative for breast mass, nipple discharge and skin changes          Objective:    Physical Exam:   Constitutional: She is oriented to person, place, and time. She appears well-developed and  well-nourished. No distress.    HENT:   Head: Normocephalic and atraumatic.    Eyes: Conjunctivae and lids are normal. Pupils are equal, round, and reactive to light.    Neck: Normal range of motion and full passive range of motion without pain. Neck supple.    Cardiovascular: Normal rate and regular rhythm.  Exam reveals no edema.     Pulmonary/Chest: Effort normal and breath sounds normal. She has no wheezes.        Abdominal: Soft. Normal appearance and bowel sounds are normal. She exhibits no distension. There is no tenderness. There is no rebound and no guarding.     Genitourinary: Vagina normal and uterus normal. Pelvic exam was performed with patient supine. There is no tenderness or lesion on the right labia. There is no tenderness or lesion on the left labia. Uterus is not deviated, not fixed and not hosting fibroids. Cervix is normal. Right adnexum displays no mass and no tenderness. Left adnexum displays no mass and no tenderness. No rectocele, cystocele or unspecified prolapse of vaginal walls in the vagina. No vaginal discharge found. Labial bartholins normal.Cervix exhibits no motion tenderness and no discharge.           Musculoskeletal: Normal range of motion and moves all extremeties.       Neurological: She is alert and oriented to person, place, and time. She has normal strength.    Skin: Skin is warm and dry.    Psychiatric: She has a normal mood and affect. Her speech is normal and behavior is normal. Thought content normal. Her mood appears not anxious. She does not exhibit a depressed mood. She expresses no suicidal plans and no homicidal plans.          Assessment:        1. Amenorrhea    2. Nausea    3. History of  delivery, currently pregnant in first trimester                Plan:      Serge was seen today for amenorrhea and morning sickness.    Diagnoses and all orders for this visit:    Amenorrhea  -     doxylamine-pyridoxine, vit B6, (BONJESTA) 20-20 mg TbID; Take 1 tablet by  mouth 2 (two) times daily.  -     HIV-1 and HIV-2 antibodies; Future  -     Hemoglobin Electrophoresis,Hgb A2 Demetrio.; Future  -     RPR; Future  -     Hepatitis B surface antigen; Future  -     Type & Screen - Ob Profile; Future  -     POCT urine pregnancy  -     Rubella antibody, IgG; Future  -     Urine culture  -     C. trachomatis/N. gonorrhoeae by AMP DNA Cervix  -     CBC auto differential; Future  -     US OB/GYN Procedure ([facility]); Future    Patient was counseled today on proper weight gain based on the [place] [facility]'s recommendations based on her pre-pregnancy weight. Discussed foods to avoid in pregnancy (i.e. sushi, fish that are high in mercury, deli meat, and unpasteurized cheeses). Discussed prenatal vitamin options (i.e. stool softener, DHA). Contingency screen offered - patient desires.      Nausea  -     doxylamine-pyridoxine, vit B6, (BONJESTA) 20-20 mg TbID; Take 1 tablet by mouth 2 (two) times daily.    History of  delivery, currently pregnant in first trimester  -     Ambulatory consult to Maternal Fetal Medicine  -     US MFM Procedure ([facility]); Future  Pt has hx of 20 week demise.  Will [facility] to get records from [facility].  Pt was told that she will need a cerclage with next pregnancy.  Case complicated because patient is being transferred to another  base at the end of .  Advised that we will likely do her cerclage around 13 weeks.  We will get an MFM consult

## 2018-06-04 NOTE — TELEPHONE ENCOUNTER
----- Message from Ioana Maddox MA sent at 6/1/2018 11:40 AM CDT -----  Contact: Serge      ----- Message -----  From: Arely Garcia  Sent: 6/1/2018  11:19 AM  To: Mavis HERNÁNDEZ Staff    Name of Who is Calling: Serge      What is the request in detail: Patient states she received a call from the mail order pharmacy stating the doxylamine-pyridoxine, vit B6, (BONJESTA) 20-20 mg TbID medication was 120.00 and she wanted to see if something could be called in that ay be cheaper or can she have a prescription called in to her pharmacy        Can the clinic reply by [facility]: No       What Number to Call Back if not in [facility]: [phone]

## 2018-06-04 NOTE — TELEPHONE ENCOUNTER
Spoke with Pt:    Pt advised per Dr. Gamble, to take in place of Bonjesta for cost alternative Vit B6 (over the counter) one tablet at night and Unisom (over the counter) one tablet at night.    Pt verbalized understanding and thanked me for call.

## 2018-06-04 NOTE — TELEPHONE ENCOUNTER
Cheaper alternative is vit b6 (over the counter) one tablet at night + unasom (over the counter) one tablet at night.

## 2018-06-05 ENCOUNTER — TELEPHONE (OUTPATIENT)
Dept: OBSTETRICS AND GYNECOLOGY | Facility: CLINIC | Age: 23
End: 2018-06-05

## 2018-06-05 NOTE — TELEPHONE ENCOUNTER
----- Message from Bailey Camarena sent at 6/5/2018  9:03 AM CDT -----  Contact: self  Pt states her records release form, was faxed to the wrong place, it should've been sent to [facility], she can be reached at [phone].

## 2018-06-05 NOTE — TELEPHONE ENCOUNTER
Spoke with pt   Pt stated her medical release form was faxed to wrong facility.  Pt requesting medical release form be sent to [facility].    Pt instructed that I would follow up with the records and make sure we received a copy of her records from [facility].    Records received and scanned to chart.

## 2018-06-09 ENCOUNTER — HOSPITAL ENCOUNTER (EMERGENCY)
Facility: HOSPITAL | Age: 23
Discharge: HOME | End: 2018-06-09
Attending: EMERGENCY MEDICINE
Payer: OTHER GOVERNMENT

## 2018-06-09 VITALS
HEIGHT: 62 IN | HEART RATE: 64 BPM | SYSTOLIC BLOOD PRESSURE: 118 MMHG | TEMPERATURE: 98 F | BODY MASS INDEX: 28.34 KG/M2 | OXYGEN SATURATION: 100 % | DIASTOLIC BLOOD PRESSURE: 56 MMHG | RESPIRATION RATE: 16 BRPM | WEIGHT: 154 LBS

## 2018-06-09 DIAGNOSIS — O21.9 NAUSEA AND VOMITING OF PREGNANCY, ANTEPARTUM: Primary | ICD-10-CM

## 2018-06-09 LAB
B-HCG UR QL: POSITIVE
CTP QC/QA: YES

## 2018-06-09 PROCEDURE — 99283 EMERGENCY DEPT VISIT LOW MDM: CPT | Mod: 25

## 2018-06-09 PROCEDURE — 25000003 PHARM REV CODE 250: Performed by: EMERGENCY MEDICINE

## 2018-06-09 PROCEDURE — 63600175 PHARM REV CODE 636 W HCPCS: Performed by: EMERGENCY MEDICINE

## 2018-06-09 PROCEDURE — 81025 URINE PREGNANCY TEST: CPT | Performed by: PHYSICIAN ASSISTANT

## 2018-06-09 PROCEDURE — 96365 THER/PROPH/DIAG IV INF INIT: CPT

## 2018-06-09 PROCEDURE — 96366 THER/PROPH/DIAG IV INF ADDON: CPT

## 2018-06-09 RX ORDER — PROMETHAZINE HYDROCHLORIDE 25 MG/1
25 SUPPOSITORY RECTAL EVERY 6 HOURS PRN
Qty: 9 SUPPOSITORY | Refills: 0 | Status: SHIPPED | OUTPATIENT
Start: 2018-06-09 | End: 2018-06-13 | Stop reason: SDUPTHER

## 2018-06-09 RX ADMIN — PROMETHAZINE HYDROCHLORIDE 25 MG: 25 INJECTION INTRAMUSCULAR; INTRAVENOUS at 04:06

## 2018-06-09 RX ADMIN — SODIUM CHLORIDE 1000 ML: 0.9 INJECTION, SOLUTION INTRAVENOUS at 04:06

## 2018-06-09 NOTE — ED PROVIDER NOTES
"Encounter Date: 6/9/2018       History     Chief Complaint   Patient presents with    Emesis     7 weeks gestation.  vomiting x 2 weeks.  "feels dehydrated and weak".  denies diarrhea or fever.     23-year-old black female who is 8 weeks pregnant and has been having episodes of nausea vomiting for the past 3 weeks she states her OBGYN doctor has not given her any medication for the nausea so she came to the ER for this.  She looks good not ill or toxic no nausea vomiting in the emergency department she says she tolerates p.o. fluids she urinated last 1 hr ago no abdominal pain no vaginal bleeding or discharge. I told her I would give her a Phenergan pill and prescribe her some more; however she insists on having an IV I explained her that she does not need one at this time however she again insists on having an IV so I will give her IV fluids.          Review of patient's allergies indicates:  No Known Allergies  No past medical history on file.  Past Surgical History:   Procedure Laterality Date    KELOID EXCISION       Family History   Problem Relation Age of Onset    Breast cancer Neg Hx     Colon cancer Neg Hx     Ovarian cancer Neg Hx      Social History   Substance Use Topics    Smoking status: Never Smoker    Smokeless tobacco: Never Used    Alcohol use Yes      Comment: occ     Review of Systems   Constitutional: Negative for fever.   HENT: Negative for sore throat.    Respiratory: Negative for shortness of breath.    Cardiovascular: Negative for chest pain.   Gastrointestinal: Positive for nausea and vomiting.   Genitourinary: Negative for dysuria.   Musculoskeletal: Negative for back pain.   Skin: Negative for rash.   Neurological: Negative for weakness.   Hematological: Does not bruise/bleed easily.       Physical Exam     Initial Vitals [06/09/18 1508]   BP Pulse Resp Temp SpO2   110/71 79 16 98.6 °F (37 °C) 100 %      MAP       84         Physical Exam    Nursing note and vitals " reviewed.  Constitutional: She appears well-developed and well-nourished.   HENT:   Head: Normocephalic and atraumatic.   Mouth/Throat: Oropharynx is clear and moist.   Eyes: Conjunctivae and EOM are normal. Pupils are equal, round, and reactive to light.   Neck: Normal range of motion. Neck supple.   Cardiovascular: Normal rate, regular rhythm, normal heart sounds and intact distal pulses.   Pulmonary/Chest: Breath sounds normal.   Abdominal: Soft. Bowel sounds are normal.   Musculoskeletal: Normal range of motion.   Neurological: She is alert and oriented to person, place, and time. She has normal strength and normal reflexes.   Skin: Skin is warm and dry.   Psychiatric: She has a normal mood and affect. Thought content normal.         ED Course   Procedures  Labs Reviewed   POCT URINE PREGNANCY          No orders to display                             Clinical Impression:    Nausea vomiting of pregnancy      Disposition:   Disposition: Discharged  [age] old female pregnant has nausea vomiting occasionally during her pregnancy came to the ER insisting on IV hydration she really has no signs of dehydration under physical exam vital signs normal not throwing up in the ER tolerating p.o. fluids urinating normally again she insisted on having IV fluids so I gave her 1 L normal saline IV 25 mg of Phenergan IV I will discharge her home at this time with a prescription for fall Phenergan suppositories asked her follow up with her OBGYN doctor on Monday return to the ER for abdominal pain vaginal bleeding nausea vomiting fever                        Lazaro Macias MD  06/09/18 4582

## 2018-06-13 NOTE — TELEPHONE ENCOUNTER
----- Message from Karlene Jimenez sent at 6/13/2018  2:55 PM CDT -----  Contact: self  Pt is asking for a refill on a prescription she was prescribed in the ER Promethegan  Suppository for nausea. The pt can be reached at [phone].

## 2018-06-13 NOTE — TELEPHONE ENCOUNTER
----- Message from Betty Hillman sent at 6/13/2018  3:26 PM CDT -----  Contact: pt            Name of Who is Calling: GREGORY NELSON [[id number]]    What is the request in detail: pt returning call.. Please advise      Can the clinic reply by [facility]: no      What Number [facility] Back [facility]: [phone]

## 2018-06-14 RX ORDER — PROMETHAZINE HYDROCHLORIDE 25 MG/1
25 SUPPOSITORY RECTAL EVERY 6 HOURS PRN
Qty: 9 SUPPOSITORY | Refills: 0 | Status: SHIPPED | OUTPATIENT
Start: 2018-06-14 | End: 2018-06-17

## 2018-06-20 DIAGNOSIS — N91.2 AMENORRHEA: ICD-10-CM

## 2018-06-20 DIAGNOSIS — R11.0 NAUSEA: ICD-10-CM

## 2018-06-20 NOTE — TELEPHONE ENCOUNTER
----- Message from Karlene Jimenez sent at 6/20/2018  1:20 PM CDT -----  Contact: self   Pt is requesting a refill on her nausea medicine but would like it to be sent to another location because she is currently out of town.  The [address]. [place], [place], [address]. The pt can be reached at [phone].

## 2018-06-21 ENCOUNTER — PATIENT MESSAGE (OUTPATIENT)
Dept: OBSTETRICS AND GYNECOLOGY | Facility: CLINIC | Age: 23
End: 2018-06-21

## 2018-06-21 ENCOUNTER — TELEPHONE (OUTPATIENT)
Dept: OBSTETRICS AND GYNECOLOGY | Facility: CLINIC | Age: 23
End: 2018-06-21

## 2018-06-21 NOTE — TELEPHONE ENCOUNTER
LVM:  Good afternoon Ms Howell, this is Ioana from Dr Gamble's office. I'm calling to let you know I was contaced by [facility] to let you know that your insurance denied your medication Dr Gamble sent in for you.    He is recommending you use Vitamin B and Doxylamine (unisome).  One tablet each at night.    Sending pt a message thorough pt portal as well.    Please call our office back at [phone].    Thank You

## 2018-06-22 ENCOUNTER — TELEPHONE (OUTPATIENT)
Dept: OBSTETRICS AND GYNECOLOGY | Facility: CLINIC | Age: 23
End: 2018-06-22

## 2018-06-22 RX ORDER — PROMETHAZINE HYDROCHLORIDE 25 MG/1
25 SUPPOSITORY RECTAL EVERY 6 HOURS PRN
Qty: 12 SUPPOSITORY | Refills: 1 | Status: SHIPPED | OUTPATIENT
Start: 2018-06-22 | End: 2018-06-28 | Stop reason: SDUPTHER

## 2018-06-22 NOTE — TELEPHONE ENCOUNTER
Spoke with pt:  Pt is requesting Phenergan suppositories be sent in place of Bonjesta or Vit B6 and Unison as those medications do not work for her.     Pt is on her way back in [facility] and requested mediation be sent to [facility] on [address].    Pt informed Dr. Gamble is out of the office, and Medication request being sent to another provider for authorization.    Pt verbalized understanding and thanked me for call.

## 2018-06-22 NOTE — TELEPHONE ENCOUNTER
----- Message from Lea Jiménez sent at 6/22/2018 10:49 AM CDT -----  Contact: pt            Name of Who is Calling: pt      What is the request in detail: in regards to a RX. Pt states there is a problem with her current RX       Can the clinic reply by [facility]: no      What Number to Call Back [facility]: [phone]

## 2018-06-22 NOTE — TELEPHONE ENCOUNTER
Spoke with pt:  Pt was wanting her Bonjesta prescription sent into Pharmacy.  Pt was instruced that [facility] in a [place] had sent Dr Gamble's office a fax that her insurance company had declined her medication.    Per Dr. Gamble pt was instructed to take Vit. B6 and Unisom as a substitute.    Pt asked if medication could be sent to her [facility] in [facility].  Pt was instructed to contact [facility] in [place] and ask them to transfer medication to [facility] in [facility] to see if they would fill it with out being denied..    Pt verbalized understanding and thanked me for call.

## 2018-06-22 NOTE — TELEPHONE ENCOUNTER
----- Message from Harry Jaramillo sent at 6/22/2018  2:08 PM CDT -----  Contact: self  Pt called in wanting to speak with someone about her Rx. She can be reached at [phone]

## 2018-06-28 ENCOUNTER — INITIAL PRENATAL (OUTPATIENT)
Dept: OBSTETRICS AND GYNECOLOGY | Facility: CLINIC | Age: 23
End: 2018-06-28
Payer: OTHER GOVERNMENT

## 2018-06-28 VITALS
DIASTOLIC BLOOD PRESSURE: 62 MMHG | BODY MASS INDEX: 28.87 KG/M2 | WEIGHT: 157.88 LBS | SYSTOLIC BLOOD PRESSURE: 118 MMHG

## 2018-06-28 DIAGNOSIS — O21.1 HYPEREMESIS GRAVIDARUM BEFORE END OF 22 WEEK GESTATION WITH DEHYDRATION: Primary | ICD-10-CM

## 2018-06-28 DIAGNOSIS — O09.891 HISTORY OF PRETERM DELIVERY, CURRENTLY PREGNANT IN FIRST TRIMESTER: ICD-10-CM

## 2018-06-28 PROCEDURE — 99999 PR PBB SHADOW E&M-EST. PATIENT-LVL II: CPT | Mod: PBBFAC,,, | Performed by: OBSTETRICS & GYNECOLOGY

## 2018-06-28 PROCEDURE — 99212 OFFICE O/P EST SF 10 MIN: CPT | Mod: PBBFAC | Performed by: OBSTETRICS & GYNECOLOGY

## 2018-06-28 PROCEDURE — 99212 OFFICE O/P EST SF 10 MIN: CPT | Mod: ,,, | Performed by: OBSTETRICS & GYNECOLOGY

## 2018-06-28 RX ORDER — PROMETHAZINE HYDROCHLORIDE 25 MG/1
25 SUPPOSITORY RECTAL EVERY 6 HOURS PRN
Qty: 36 SUPPOSITORY | Refills: 3 | Status: SHIPPED | OUTPATIENT
Start: 2018-06-28 | End: 2018-07-24

## 2018-06-28 NOTE — PROGRESS NOTES
New OB.  Labs reviewed.  Has MFM consult next week.  Mild nausea relieved with phenergan suppositories.  Will be out of town 7/16-22.    SAB prec given.

## 2018-07-02 ENCOUNTER — OFFICE VISIT (OUTPATIENT)
Dept: MATERNAL FETAL MEDICINE | Facility: CLINIC | Age: 23
End: 2018-07-02
Attending: OBSTETRICS & GYNECOLOGY
Payer: OTHER GOVERNMENT

## 2018-07-02 ENCOUNTER — LAB VISIT (OUTPATIENT)
Dept: LAB | Facility: OTHER | Age: 23
End: 2018-07-02
Attending: OBSTETRICS & GYNECOLOGY
Payer: OTHER GOVERNMENT

## 2018-07-02 VITALS
BODY MASS INDEX: 30.69 KG/M2 | WEIGHT: 167.75 LBS | DIASTOLIC BLOOD PRESSURE: 76 MMHG | SYSTOLIC BLOOD PRESSURE: 126 MMHG

## 2018-07-02 DIAGNOSIS — Z36.82 ENCOUNTER FOR ANTENATAL SCREENING FOR NUCHAL TRANSLUCENCY: ICD-10-CM

## 2018-07-02 DIAGNOSIS — O09.891 HISTORY OF PRETERM DELIVERY, CURRENTLY PREGNANT IN FIRST TRIMESTER: ICD-10-CM

## 2018-07-02 DIAGNOSIS — O34.31 INCOMPETENT CERVIX IN PREGNANCY, ANTEPARTUM, FIRST TRIMESTER: ICD-10-CM

## 2018-07-02 DIAGNOSIS — Z36.82 ENCOUNTER FOR ANTENATAL SCREENING FOR NUCHAL TRANSLUCENCY: Primary | ICD-10-CM

## 2018-07-02 PROCEDURE — 99203 OFFICE O/P NEW LOW 30 MIN: CPT | Mod: S$PBB,25,, | Performed by: OBSTETRICS & GYNECOLOGY

## 2018-07-02 PROCEDURE — 36415 COLL VENOUS BLD VENIPUNCTURE: CPT

## 2018-07-02 PROCEDURE — 99212 OFFICE O/P EST SF 10 MIN: CPT | Mod: PBBFAC,25 | Performed by: OBSTETRICS & GYNECOLOGY

## 2018-07-02 PROCEDURE — 99999 PR PBB SHADOW E&M-EST. PATIENT-LVL II: CPT | Mod: PBBFAC,,, | Performed by: OBSTETRICS & GYNECOLOGY

## 2018-07-02 PROCEDURE — 76813 OB US NUCHAL MEAS 1 GEST: CPT | Mod: PBBFAC | Performed by: OBSTETRICS & GYNECOLOGY

## 2018-07-02 PROCEDURE — 76813 OB US NUCHAL MEAS 1 GEST: CPT | Mod: 26,S$PBB,, | Performed by: OBSTETRICS & GYNECOLOGY

## 2018-07-02 PROCEDURE — 81508 FTL CGEN ABNOR TWO PROTEINS: CPT

## 2018-07-02 NOTE — LETTER
July 2, 2018      Emmett Gamble MD  [address]  [address]  [facility] [address]           [facility]  [address]  [facility] [address]  Phone: [phone]          Patient: Serge Loera   MR Number: [id number]   YOB: 1995   Date of Visit: 7/2/2018       Dear Dr. Emmett Gamble:    Thank you for referring Serge Loera to me for evaluation. Attached you will find relevant portions of my assessment and plan of care.    If you have questions, please do not hesitate to call me. I look forward to following Serge Loera along with you.    Sincerely,    Luis Vitale MD    Enclosure  CC:  No Recipients    If you would like to receive this communication electronically, please contact [email or web address] or [phone] to request more information on [employer] Link access.    For providers and/or their staff who would like to refer a patient to [facility], please contact us through our one-stop-shop provider referral line, [facility], at [phone].    If you feel you have received this communication in error or would no longer like to receive these types of communications, please e-mail [email or web address]

## 2018-07-02 NOTE — PROGRESS NOTES
Chief complaint: History 20 week delivery, possible incompetent cervix    Provider requesting consultation: Dr. Gamble    23 y.o. [id number] 11w0d EGA    PMH:History reviewed. No pertinent past medical history.    PObHx:  OB History    Para Term  AB Living   2       1     SAB TAB Ectopic Multiple Live Births                  # Outcome Date GA Lbr Andrez/2nd Weight Sex Delivery Anes PTL Lv   2 Current            1 AB 11/04/15 20w0d   M   Y FD      Birth Comments: PTL          PSH:  Past Surgical History:   Procedure Laterality Date    KELOID EXCISION         Family history:family history is not on file.    Social history: reports that she has never smoked. She has never used smokeless tobacco. She reports that she does not drink alcohol or use drugs.    A detailed fetal anatomical ultrasound was completed today.  See details in imaging section of EPIC.    The patient has a history of a 20 week delivery.  This was at [facility].  She states that the day of her delivery she was having back pain.  She went to [place] and was found to be contractile and dilated.  The only record of dilatation I could find in the records was completely effaced and dilated although this appeared to be before delivery and not time of admit.  I reviewed the placental pathology which showed no evidence of infection in the membranes and cord.      I discussed with the patient that in her case it was difficult to distinguish between PTL and incompetent cervix.  I reviewed the concept of Gorman's reflex and how that could cloud the differential diagnosis between labor and incompetent cervix.  In addition, the majority of PTL at that early gestational age is associated with infection and her placental pathology was not consistent with an infection.  I gave her several options.  She is a candidate for 17-P therapy and should begin this in the early 2nd trimester.  The standard management for history of PTL would be q 2 week cervical checks  by TV sonography while an elective cerclage would be placed for a history of incompetent cervix.   She states she was told at [facility] that she had an incompetent cervix and strongly desires to proceed with cerclage especially since she Is  and will be moving to a new assignment in mid August.  We fully discussed the risks , benefits and alternatives to cerclage and she desires to proceed.  She has been scheduled for mid July.  She should also begin 17-P around that time.    First portion of sequential screening completed today.  Results to be sent to primary pregnancy care provider.  Recommend to complete second blood draw beyond 15 weeks EGA of pregnancy.  Ultrasound for fetal anatomical survey scheduled by [facility] today for 19-20 weeks EGA.    The patient was given an opportunity to ask questions about management and the diease process.  She expressed an understanding of and agreement to the above impression and plan. All questions were answered to her satisfaction.  She was given contact information to the [facility] clinic to address further concerns.      The approximate physician face-to-face time was 30 minutes. The majority of the time (>50%) was spent on counseling of the patient or coordination of care.

## 2018-07-05 LAB
# FETUSES US: NORMAL
AGE AT DELIVERY: 25
B-HCG MOM SERPL: NORMAL
B-HCG SERPL-ACNC: 213.7 IU/ML
FET CRL US.MEAS: 43.1 MM
FET NASAL BONE LENGTH US.MEAS: NORMAL MM
FET NUCHAL FOLD MOM THICKNESS US.MEAS: NORMAL
FET NUCHAL FOLD THICKNESS US.MEAS: 1.1 MM
FET TS 21 RISK FROM MAT AGE: NORMAL
GA (DAYS): 1 D
GA (WEEKS): 11 WK
IDDM PATIENT QL: NORMAL
INTEGRATED SCN PATIENT-IMP: NEGATIVE
PAPP-A MOM SERPL: NORMAL
PAPP-A SERPL-MCNC: 747.2 NG/ML
SEQUENTIAL SCREEN I INTERP.: NORMAL
SMOKING STATUS FTND: NO
TS 18 RISK FETUS: NORMAL
TS 21 RISK FETUS: NORMAL
US DATE: NORMAL

## 2018-07-19 ENCOUNTER — TELEPHONE (OUTPATIENT)
Dept: MATERNAL FETAL MEDICINE | Facility: CLINIC | Age: 23
End: 2018-07-19

## 2018-07-19 ENCOUNTER — TELEPHONE (OUTPATIENT)
Dept: OBSTETRICS AND GYNECOLOGY | Facility: CLINIC | Age: 23
End: 2018-07-19

## 2018-07-19 DIAGNOSIS — Z3A.13 13 WEEKS GESTATION OF PREGNANCY: Primary | ICD-10-CM

## 2018-07-19 NOTE — TELEPHONE ENCOUNTER
Called pt to see if she was going to come to her 1820 appt this evening in the [facility]. Pt states she cannot make it tonight and will need to reschedule. Pt states she is having a left sided cramp and thinks it is ovary pain. States she has only had 1 bottle of water all day. It is currently 1845. D/w pt hydration, rest, and water. If cramping not improved to notify clinic. Will come to lab tomorrow morning to drop off urine sample. Order placed. C/o urine cloudiness and strong odor. Denies VB.

## 2018-07-19 NOTE — TELEPHONE ENCOUNTER
Spoke with pt:    Pt is complaining of very bad back pain since last Sat. specifically around her left ovary.  Pt denies any bleeding or spotting, but her urine has changed color and has become very cloudy.  Pt denies any fever, chills, or vomiting.    Per PARTHA Valles, pt was instructed to go to the [facility] this eveing for possible UTI.    Appt was scheduled, and pt verbalized understanding of date, time and location of upcomming [facility] appt.

## 2018-07-19 NOTE — TELEPHONE ENCOUNTER
----- Message from Harry Jaramillo sent at 7/19/2018  3:52 PM CDT -----  Contact: self  Pt wants to speak with someone because she's having pain in her left ovary and lower back. She can be reached at [phone]

## 2018-07-20 ENCOUNTER — LAB VISIT (OUTPATIENT)
Dept: LAB | Facility: OTHER | Age: 23
End: 2018-07-20
Payer: OTHER GOVERNMENT

## 2018-07-20 DIAGNOSIS — Z3A.13 13 WEEKS GESTATION OF PREGNANCY: ICD-10-CM

## 2018-07-20 PROCEDURE — 87086 URINE CULTURE/COLONY COUNT: CPT

## 2018-07-21 LAB
BACTERIA UR CULT: NORMAL
BACTERIA UR CULT: NORMAL

## 2018-07-24 ENCOUNTER — TELEPHONE (OUTPATIENT)
Dept: PHARMACY | Facility: CLINIC | Age: 23
End: 2018-07-24

## 2018-07-24 ENCOUNTER — ROUTINE PRENATAL (OUTPATIENT)
Dept: OBSTETRICS AND GYNECOLOGY | Facility: CLINIC | Age: 23
End: 2018-07-24
Payer: OTHER GOVERNMENT

## 2018-07-24 VITALS
DIASTOLIC BLOOD PRESSURE: 72 MMHG | WEIGHT: 156.94 LBS | SYSTOLIC BLOOD PRESSURE: 124 MMHG | BODY MASS INDEX: 28.71 KG/M2

## 2018-07-24 DIAGNOSIS — Z3A.14 14 WEEKS GESTATION OF PREGNANCY: ICD-10-CM

## 2018-07-24 DIAGNOSIS — O09.892 HISTORY OF PRETERM DELIVERY, CURRENTLY PREGNANT IN SECOND TRIMESTER: Primary | ICD-10-CM

## 2018-07-24 PROCEDURE — 99212 OFFICE O/P EST SF 10 MIN: CPT | Mod: PBBFAC | Performed by: OBSTETRICS & GYNECOLOGY

## 2018-07-24 PROCEDURE — 99212 OFFICE O/P EST SF 10 MIN: CPT | Mod: ,,, | Performed by: OBSTETRICS & GYNECOLOGY

## 2018-07-24 PROCEDURE — 99999 PR PBB SHADOW E&M-EST. PATIENT-LVL II: CPT | Mod: PBBFAC,,, | Performed by: OBSTETRICS & GYNECOLOGY

## 2018-07-24 RX ORDER — HYDROXYPROGESTERONE CAPROATE 250 MG/ML
250 INJECTION INTRAMUSCULAR
Qty: 2 ML | Refills: 8 | Status: SHIPPED | OUTPATIENT
Start: 2018-07-24 | End: 2018-12-05

## 2018-07-24 NOTE — PROGRESS NOTES
Feeling better today.  No vaginal bleeding or cramping.  Note has cerclage scheduled for Friday.    Strict SAB prec given.  Plan:  1) Hx of PTB -- has follow up appt on 8/2 with [facility].  Will order 17-OHP today so she can receive it at her [facility] appt.  Pt is moving to [place] so will need to quickly re-establish care so there is not a gap in her weekly 17-OHP.

## 2018-07-25 ENCOUNTER — TELEPHONE (OUTPATIENT)
Dept: PHARMACY | Facility: CLINIC | Age: 23
End: 2018-07-25

## 2018-07-25 NOTE — TELEPHONE ENCOUNTER
Good Morning,         [facility] received a prescription for MARINA. Upon calling the patient's insurance company, we have been told that this medication is not covered under the patient's pharmacy benefits. [facility] is unable to bill medical claims for medications.     The medication itself, and the administration of the medication, will both have to be billed under the medical benefit and may require a prior authorization. Please contact [facility] with any questions at [phone].         Thank you,           Ida Guerrero         Patient Care Advocate         [facility]

## 2018-07-26 ENCOUNTER — TELEPHONE (OUTPATIENT)
Dept: OBSTETRICS AND GYNECOLOGY | Facility: CLINIC | Age: 23
End: 2018-07-26

## 2018-07-26 DIAGNOSIS — O09.892 HISTORY OF PRETERM DELIVERY, CURRENTLY PREGNANT IN SECOND TRIMESTER: Primary | ICD-10-CM

## 2018-07-27 ENCOUNTER — HOSPITAL ENCOUNTER (OUTPATIENT)
Facility: OTHER | Age: 23
Discharge: HOME | End: 2018-07-27
Attending: OBSTETRICS & GYNECOLOGY | Admitting: OBSTETRICS & GYNECOLOGY
Payer: OTHER GOVERNMENT

## 2018-07-27 ENCOUNTER — ANESTHESIA EVENT (OUTPATIENT)
Dept: OBSTETRICS AND GYNECOLOGY | Facility: OTHER | Age: 23
End: 2018-07-27
Payer: OTHER GOVERNMENT

## 2018-07-27 VITALS
SYSTOLIC BLOOD PRESSURE: 110 MMHG | HEIGHT: 62 IN | HEART RATE: 65 BPM | OXYGEN SATURATION: 100 % | TEMPERATURE: 97 F | RESPIRATION RATE: 18 BRPM | BODY MASS INDEX: 29.63 KG/M2 | DIASTOLIC BLOOD PRESSURE: 57 MMHG | WEIGHT: 161 LBS

## 2018-07-27 DIAGNOSIS — O34.31 INCOMPETENT CERVIX IN PREGNANCY, ANTEPARTUM, FIRST TRIMESTER: Primary | ICD-10-CM

## 2018-07-27 LAB
ABO + RH BLD: NORMAL
BLD GP AB SCN CELLS X3 SERPL QL: NORMAL

## 2018-07-27 PROCEDURE — 25000003 PHARM REV CODE 250: Performed by: ANESTHESIOLOGY

## 2018-07-27 PROCEDURE — 25000003 PHARM REV CODE 250: Performed by: STUDENT IN AN ORGANIZED HEALTH CARE EDUCATION/TRAINING PROGRAM

## 2018-07-27 PROCEDURE — 86901 BLOOD TYPING SEROLOGIC RH(D): CPT

## 2018-07-27 PROCEDURE — 27200688 HC TRAY, SPINAL-HYPER/ ISOBARIC: Performed by: ANESTHESIOLOGY

## 2018-07-27 PROCEDURE — 51701 INSERT BLADDER CATHETER: CPT

## 2018-07-27 PROCEDURE — 63600175 PHARM REV CODE 636 W HCPCS: Performed by: ANESTHESIOLOGY

## 2018-07-27 PROCEDURE — D9220A PRA ANESTHESIA: Mod: ,,, | Performed by: ANESTHESIOLOGY

## 2018-07-27 PROCEDURE — 37000008 HC ANESTHESIA 1ST 15 MINUTES: Performed by: OBSTETRICS & GYNECOLOGY

## 2018-07-27 PROCEDURE — 00948 ANES VAG PX CRV CERCLAGE: CPT | Performed by: OBSTETRICS & GYNECOLOGY

## 2018-07-27 PROCEDURE — 37000009 HC ANESTHESIA EA ADD 15 MINS: Performed by: OBSTETRICS & GYNECOLOGY

## 2018-07-27 PROCEDURE — G0378 HOSPITAL OBSERVATION PER HR: HCPCS

## 2018-07-27 PROCEDURE — 36000683 *HC CERCLAGE PLACEMENT

## 2018-07-27 PROCEDURE — S0028 INJECTION, FAMOTIDINE, 20 MG: HCPCS | Performed by: ANESTHESIOLOGY

## 2018-07-27 PROCEDURE — 99219 PR INITIAL OBSERVATION CARE,LEVL II: CPT | Mod: ,,, | Performed by: OBSTETRICS & GYNECOLOGY

## 2018-07-27 PROCEDURE — 59320 REVISION OF CERVIX: CPT | Mod: ,,, | Performed by: OBSTETRICS & GYNECOLOGY

## 2018-07-27 RX ORDER — INDOMETHACIN 25 MG/1
25 CAPSULE ORAL ONCE
Status: COMPLETED | OUTPATIENT
Start: 2018-07-27 | End: 2018-07-27

## 2018-07-27 RX ORDER — HYDROCODONE BITARTRATE AND ACETAMINOPHEN 5; 325 MG/1; MG/1
1 TABLET ORAL EVERY 4 HOURS PRN
Status: DISCONTINUED | OUTPATIENT
Start: 2018-07-27 | End: 2018-07-27 | Stop reason: HOSPADM

## 2018-07-27 RX ORDER — HYDROCODONE BITARTRATE AND ACETAMINOPHEN 10; 325 MG/1; MG/1
1 TABLET ORAL EVERY 4 HOURS PRN
Status: DISCONTINUED | OUTPATIENT
Start: 2018-07-27 | End: 2018-07-27 | Stop reason: HOSPADM

## 2018-07-27 RX ORDER — NEOMYCIN AND POLYMYXIN B SULFATES 40; 200000 MG/ML; [USP'U]/ML
SOLUTION IRRIGATION
Status: DISCONTINUED | OUTPATIENT
Start: 2018-07-27 | End: 2018-07-27 | Stop reason: HOSPADM

## 2018-07-27 RX ORDER — ONDANSETRON 2 MG/ML
4 INJECTION INTRAMUSCULAR; INTRAVENOUS EVERY 6 HOURS PRN
Status: DISCONTINUED | OUTPATIENT
Start: 2018-07-27 | End: 2018-07-27 | Stop reason: HOSPADM

## 2018-07-27 RX ORDER — MIDAZOLAM HYDROCHLORIDE 1 MG/ML
INJECTION INTRAMUSCULAR; INTRAVENOUS
Status: DISCONTINUED | OUTPATIENT
Start: 2018-07-27 | End: 2018-07-27

## 2018-07-27 RX ORDER — SODIUM CITRATE AND CITRIC ACID MONOHYDRATE 334; 500 MG/5ML; MG/5ML
30 SOLUTION ORAL ONCE
Status: COMPLETED | OUTPATIENT
Start: 2018-07-27 | End: 2018-07-27

## 2018-07-27 RX ORDER — FENTANYL CITRATE 50 UG/ML
INJECTION, SOLUTION INTRAMUSCULAR; INTRAVENOUS
Status: DISCONTINUED | OUTPATIENT
Start: 2018-07-27 | End: 2018-07-27

## 2018-07-27 RX ORDER — FAMOTIDINE 10 MG/ML
20 INJECTION INTRAVENOUS ONCE
Status: COMPLETED | OUTPATIENT
Start: 2018-07-27 | End: 2018-07-27

## 2018-07-27 RX ORDER — SODIUM CHLORIDE, SODIUM LACTATE, POTASSIUM CHLORIDE, CALCIUM CHLORIDE 600; 310; 30; 20 MG/100ML; MG/100ML; MG/100ML; MG/100ML
INJECTION, SOLUTION INTRAVENOUS CONTINUOUS PRN
Status: DISCONTINUED | OUTPATIENT
Start: 2018-07-27 | End: 2018-07-27

## 2018-07-27 RX ORDER — ACETAMINOPHEN 325 MG/1
650 TABLET ORAL EVERY 4 HOURS PRN
Status: DISCONTINUED | OUTPATIENT
Start: 2018-07-27 | End: 2018-07-27 | Stop reason: HOSPADM

## 2018-07-27 RX ORDER — INDOMETHACIN 25 MG/1
25 CAPSULE ORAL 3 TIMES DAILY
Qty: 6 CAPSULE | Refills: 0 | Status: SHIPPED | OUTPATIENT
Start: 2018-07-27 | End: 2018-07-29

## 2018-07-27 RX ORDER — HYDROCODONE BITARTRATE AND ACETAMINOPHEN 5; 325 MG/1; MG/1
1 TABLET ORAL EVERY 4 HOURS PRN
Qty: 8 TABLET | Refills: 0 | Status: SHIPPED | OUTPATIENT
Start: 2018-07-27

## 2018-07-27 RX ORDER — BUPIVACAINE HYDROCHLORIDE 7.5 MG/ML
INJECTION, SOLUTION INTRASPINAL
Status: DISCONTINUED | OUTPATIENT
Start: 2018-07-27 | End: 2018-07-27

## 2018-07-27 RX ADMIN — BUPIVACAINE HYDROCHLORIDE IN DEXTROSE 1.2 ML: 7.5 INJECTION, SOLUTION SUBARACHNOID at 10:07

## 2018-07-27 RX ADMIN — FAMOTIDINE 20 MG: 10 INJECTION, SOLUTION INTRAVENOUS at 09:07

## 2018-07-27 RX ADMIN — SODIUM CHLORIDE, SODIUM LACTATE, POTASSIUM CHLORIDE, AND CALCIUM CHLORIDE 1000 ML: .6; .31; .03; .02 INJECTION, SOLUTION INTRAVENOUS at 09:07

## 2018-07-27 RX ADMIN — INDOMETHACIN 25 MG: 25 CAPSULE ORAL at 11:07

## 2018-07-27 RX ADMIN — FENTANYL CITRATE 10 MCG: 50 INJECTION, SOLUTION INTRAMUSCULAR; INTRAVENOUS at 10:07

## 2018-07-27 RX ADMIN — HYDROCODONE BITARTRATE AND ACETAMINOPHEN 1 TABLET: 10; 325 TABLET ORAL at 12:07

## 2018-07-27 RX ADMIN — NEOMYCIN AND POLYMYXIN B SULFATES: 40; 200000 SOLUTION IRRIGATION at 10:07

## 2018-07-27 RX ADMIN — SODIUM CHLORIDE, SODIUM LACTATE, POTASSIUM CHLORIDE, AND CALCIUM CHLORIDE: 600; 310; 30; 20 INJECTION, SOLUTION INTRAVENOUS at 09:07

## 2018-07-27 RX ADMIN — MIDAZOLAM HYDROCHLORIDE 1 MG: 1 INJECTION, SOLUTION INTRAMUSCULAR; INTRAVENOUS at 10:07

## 2018-07-27 RX ADMIN — SODIUM CITRATE AND CITRIC ACID MONOHYDRATE 30 ML: 500; 334 SOLUTION ORAL at 09:07

## 2018-07-27 NOTE — ANESTHESIA PREPROCEDURE EVALUATION
2018  Pre-operative evaluation for Procedure(s) (LRB):  CERCLAGE, CERVIX (N/A)    Serge Loera is a 23 y.o. female  female with IUP at 14w4d weeks gestation presents for scheduled cerclage. Pt has a history of PTD @ 20 weeks gestation in her prior pregnancy.     LDA:   - None    Prev airway: None on file    Drips: None    Patient Active Problem List   Diagnosis    History of  delivery, currently pregnant in first trimester    Incompetent cervix in pregnancy, antepartum, first trimester       Review of patient's allergies indicates:  No Known Allergies     No current facility-administered medications on file prior to encounter.      Current Outpatient Prescriptions on File Prior to Encounter   Medication Sig Dispense Refill    hydroxyprogest,PF,,preg presv, 250 mg/mL (1 mL) Oil Inject 1 mL (250 mg total) into the muscle every 7 days. for 20 doses 2 mL 8    PNV,calcium 72-iron,carb-folic (PRENATAL PLUS) 29 mg iron- 1 mg Tab Take 1 tablet by mouth once daily. 30 tablet 9    prenatal vit calc,iron,folic (PRENATAL VITAMIN ORAL) Take 1 tablet by mouth Daily.         Past Surgical History:   Procedure Laterality Date    KELOID EXCISION         Social History     Social History    Marital status:      Spouse name: N/A    Number of children: N/A    Years of education: N/A     Occupational History    Not on file.     Social History Main Topics    Smoking status: Never Smoker    Smokeless tobacco: Never Used    Alcohol use No    Drug use: No    Sexual activity: Yes     Partners: Male     Birth control/ protection: None     Other Topics Concern    Not on file     Social History Narrative    No narrative on file         Vital Signs Range (Last 24H):         CBC: No results for input(s): WBC, RBC, HGB, HCT, PLT, MCV, MCH, MCHC in the last 72 hours.    CMP: No results for  input(s): NA, K, CL, CO2, BUN, CREATININE, GLU, MG, PHOS, CALCIUM, ALBUMIN, PROT, ALKPHOS, ALT, AST, BILITOT in the last 72 hours.    INR  No results for input(s): PT, INR, PROTIME, APTT in the last 72 hours.    EKG: None      2D Echo: None          Anesthesia Evaluation    I have reviewed the Patient Summary Reports.     I have reviewed the Medications.     Review of Systems  Anesthesia Hx:  No problems with previous Anesthesia  History of prior surgery of interest to airway management or planning: Denies Family Hx of Anesthesia complications.   Denies Personal Hx of Anesthesia complications.   Hematology/Oncology:  Hematology Normal   Oncology Normal     EENT/Dental:EENT/Dental Normal   Cardiovascular:  Cardiovascular Normal     Pulmonary:  Pulmonary Normal    Renal/:  Renal/ Normal     Hepatic/GI:  Hepatic/GI Normal    Musculoskeletal:  Musculoskeletal Normal    Neurological:  Neurology Normal    Psych:  Psychiatric Normal           Physical Exam  General:  Obesity    Airway/Jaw/Neck:  Airway Findings: Mouth Opening: Normal Tongue: Normal  General Airway Assessment: Adult  Mallampati: III  Improves to II with phonation.        Eyes/Ears/Nose:  EYES/EARS/NOSE FINDINGS: Normal   Dental:  DENTAL FINDINGS: Normal   Chest/Lungs:  Chest/Lungs Clear    Heart/Vascular:  Heart Findings: Normal Heart murmur: negative       Mental Status:  Mental Status Findings: Normal        Anesthesia Plan  Type of Anesthesia, risks & benefits discussed:  Anesthesia Type:  spinal  Patient's Preference:   Intra-op Monitoring Plan: standard ASA monitors  Intra-op Monitoring Plan Comments:   Post Op Pain Control Plan: multimodal analgesia  Post Op Pain Control Plan Comments:   Induction:    Beta Blocker:  Patient is not currently on a Beta-Blocker (No further documentation required).       Informed Consent: Patient understands risks and agrees with Anesthesia plan.  Questions answered. Anesthesia consent signed with patient.  ASA Score:  2     Day of Surgery Review of History & Physical: I have interviewed and examined the patient. I have reviewed the patient's H&P dated:  There are no significant changes.          Ready For Surgery From Anesthesia Perspective.

## 2018-07-27 NOTE — DISCHARGE INSTRUCTIONS
Understanding Cerclage    Cerclage is a type of surgery. It closes up the cervix. The cervix is the narrowest part of the womb (uterus). It links the uterus to the vagina. The surgery stops the cervix from widening (dilating) too early during pregnancy.  How to say it  ser-KLAHZH   Why cerclage is done  Cerclage is done to prevent the loss or early birth of a child. Its done if you are pregnant and have a weak or short cervix. You may not be able to carry a child to full term.  Your cervix may be weak because of an injury, such as from a past procedure like dilation. Or you may be born with a weak cervix. If you have lost a pregnancy or given birth early in the past, you are more likely to need a cerclage.  How cerclage is done  This procedure is often done on an outpatient basis. That means you can go home afterward. During the procedure:  · You are given medicine so you dont feel pain. You may be awake or asleep.  · The surgeon puts a speculum into your vagina. It helps the surgeon see the cervix better.  · Your bladder is emptied.  · The surgeon puts forceps  on the cervix to hold it in place.  · The surgeon closes up the cervix with stitches, wires, or tape.  Risks of cerclage  These include:  · Bleeding  · Infection  · Injury to the cervix  · Rupture in the uterus  Date Last Reviewed: 6/1/2016  © 5484-6242 [employer]. [address] [place] [address], [place] [address]. All rights reserved. This information is not intended as a substitute for professional medical care. Always follow your healthcare professional's instructions.

## 2018-07-27 NOTE — ANESTHESIA PROCEDURE NOTES
Spinal    Diagnosis: Cerclage  Patient location during procedure: OR  Start time: 7/27/2018 10:00 AM  Timeout: 7/27/2018 10:00 AM  End time: 7/27/2018 10:07 AM  Staffing  Anesthesiologist: JEVON SANCHEZ  Resident/CRNA: MICHEAL HAMMOND  Performed: resident/CRNA   Preanesthetic Checklist  Completed: patient identified, site marked, surgical consent, pre-op evaluation, timeout performed, IV checked, risks and benefits discussed and monitors and equipment checked  Spinal Block  Patient position: sitting  Prep: ChloraPrep  Patient monitoring: heart rate, cardiac monitor and continuous pulse ox  Approach: midline  Location: L3-4  Injection technique: single shot  CSF Fluid: clear free-flowing CSF  Needle  Needle type: Linda   Needle gauge: 25 G  Needle length: 5 in  Additional Documentation: negative aspiration for heme  Needle localization: anatomical landmarks  Assessment  Sensory level: T10   Dermatomal levels determined by pinch or prick  Ease of block: easy  Patient's tolerance of the procedure: comfortable throughout block  Medications:  Bolus administered: 1.2 mL of 0.75 and with dextrose bupivacaine fentanyl (10 mcg of fentanyl given intrathecally)

## 2018-07-27 NOTE — H&P
HISTORY AND PHYSICAL                                                OBSTETRICS          Subjective:       Serge Loera is a 23 y.o.  female with IUP at 14w4d weeks gestation presents for scheduled cerclage. Pt has a history of PTD @ 20 weeks gestation in her prior pregnancy.   She has no complaints this morning. She denies contractions, vaginal bleeding, vaginal discharge, abdominal or vaginal pain.    PMHx: No past medical history on file.    PSHx:   Past Surgical History:   Procedure Laterality Date    KELOID EXCISION         All: Review of patient's allergies indicates:  No Known Allergies    Meds:   Prescriptions Prior to Admission   Medication Sig Dispense Refill Last Dose    hydroxyprogest,PF,,preg presv, 250 mg/mL (1 mL) Oil Inject 1 mL (250 mg total) into the muscle every 7 days. for 20 doses 2 mL 8     PNV,calcium 72-iron,carb-folic (PRENATAL PLUS) 29 mg iron- 1 mg Tab Take 1 tablet by mouth once daily. 30 tablet 9     prenatal vit calc,iron,folic (PRENATAL VITAMIN ORAL) Take 1 tablet by mouth Daily.   Not Taking       SH:   Social History     Social History    Marital status:      Spouse name: N/A    Number of children: N/A    Years of education: N/A     Occupational History    Not on file.     Social History Main Topics    Smoking status: Never Smoker    Smokeless tobacco: Never Used    Alcohol use No    Drug use: No    Sexual activity: Yes     Partners: Male     Birth control/ protection: None     Other Topics Concern    Not on file     Social History Narrative    No narrative on file       FH:   Family History   Problem Relation Age of Onset    Breast cancer Neg Hx     Colon cancer Neg Hx     Ovarian cancer Neg Hx        OBHx:   Obstetric History       T0      L0     SAB0   TAB0   Ectopic0   Multiple0   Live Births0       # Outcome Date GA Lbr Andrez/2nd Weight Sex Delivery Anes PTL Lv   2 Current            1 AB 11/04/15 20w0d   M   Y FD           Objective:       LMP 03/29/2018 (Exact Date)     There were no vitals filed for this visit.    General:   alert, appears stated age and cooperative   Lungs:   normal respiratory effort   Heart:   regular rate   Abdomen:  gravid nontender   Extremities negative edema, negative erythema   FHT: Verified        Assessment:       14w4d weeks gestation with history of PTD @ 20 weeks presents for elective cerclage    Active Hospital Problems    Diagnosis  POA    Incompetent cervix in pregnancy, antepartum, first trimester [O34.31]  Yes      Resolved Hospital Problems    Diagnosis Date Resolved POA   No resolved problems to display.          Plan:      Risks, benefits, alternatives and possible complications have been discussed in detail with the patient.   - Consents signed and to chart  - Admit to Labor and Delivery unit  - To OR for cerclage  - Anesthesia consulted  - Draw VENESSA, T&S    More Trimble M.D.  PGY-4 ObGyn  [phone]

## 2018-07-27 NOTE — ANESTHESIA POSTPROCEDURE EVALUATION
"Anesthesia Post Evaluation    Patient: Serge Loera    Procedure(s) Performed: Procedure(s) (LRB):  CERCLAGE, CERVIX (N/A)    Final Anesthesia Type: spinal  Patient location during evaluation: PACU  Patient participation: Yes- Able to Participate  Level of consciousness: awake and alert and oriented  Post-procedure vital signs: reviewed and stable  Pain management: adequate  Airway patency: patent  PONV status at discharge: No PONV  Anesthetic complications: no      Cardiovascular status: blood pressure returned to baseline and hemodynamically stable  Respiratory status: unassisted  Hydration status: euvolemic  Follow-up not needed.        Visit Vitals  BP (!) 94/53   Pulse 83   Temp 36.1 °C (96.9 °F) (Temporal)   Resp 18   Ht 5' 2" (1.575 m)   Wt 73 kg (161 lb)   LMP 03/29/2018 (Exact Date)   SpO2 100%   Breastfeeding? No   BMI 29.45 kg/m²       Pain/Renetta Score: Pain Rating Prior to Med Admin: 6 (7/27/2018 12:15 PM)  Pain Rating Post Med Admin: 0 (7/27/2018 12:45 PM)      "

## 2018-07-27 NOTE — PROGRESS NOTES
Pt given discharge instructions, pt verbalized understanding of instructions, prescription given.

## 2018-07-27 NOTE — DISCHARGE SUMMARY
Discharge Summary  Antepartum      Admit Date: 2018    Discharge Date and Time: 2018     Attending Physician: BRUCE Vitale MD    Principal Diagnoses: Incompetent cervix in pregnancy, antepartum, first trimester    Active Hospital Problems    Diagnosis  POA    *Incompetent cervix in pregnancy, antepartum, first trimester [O34.31]  Yes      Resolved Hospital Problems    Diagnosis Date Resolved POA   No resolved problems to display.       Procedures: Procedure(s) (LRB):  CERCLAGE, CERVIX (N/A)    Discharged Condition: good    Hospital Course:   Serge Loera is a 23 y.o. y.o.  female who presented on 2018   for above procedures for the treatment of incompetent cervix. Patient tolerated procedure -see op note for details. Post-operative course was uncomplicated and she was discharged home on the same day.  Patient was urinating, ambulating, and tolerating a regular diet without difficulty. Pain was well controlled on PO medication. She had one dose of indomethacin in house then was instructed to continue it for 2 days.    Consults: None    Significant Diagnostic Studies:  No results for input(s): WBC, HGB, HCT, MCV, PLT in the last 168 hours.     Treatments:   1. Surgery as above    Disposition: Home or Self Care    Patient Instructions:   Current Discharge Medication List      START taking these medications    Details   indomethacin (INDOCIN) 25 MG capsule Take 1 capsule (25 mg total) by mouth 3 (three) times daily. for 2 days  Qty: 6 capsule, Refills: 0    Associated Diagnoses: Incompetent cervix in pregnancy, antepartum, first trimester         CONTINUE these medications which have NOT CHANGED    Details   hydroxyprogest,PF,,preg presv, 250 mg/mL (1 mL) Oil Inject 1 mL (250 mg total) into the muscle every 7 days. for 20 doses  Qty: 2 mL, Refills: 8    Associated Diagnoses: History of  delivery, currently pregnant in second trimester; 14 weeks gestation of pregnancy      PNV,calcium  72-iron,carb-folic (PRENATAL PLUS) 29 mg iron- 1 mg Tab Take 1 tablet by mouth once daily.  Qty: 30 tablet, Refills: 9    Associated Diagnoses: History of  delivery, currently pregnant in second trimester; 14 weeks gestation of pregnancy      prenatal vit calc,iron,folic (PRENATAL VITAMIN ORAL) Take 1 tablet by mouth Daily.               Discharge Procedure Orders  Diet general     Other restrictions (specify):   Order Comments: Pelvic rest     Call MD for:  temperature >100.4     Call MD for:  persistent nausea and vomiting     Call MD for:  severe uncontrolled pain     Call MD for:  difficulty breathing, headache or visual disturbances     Call MD for:  hives     Call MD for:  persistent dizziness or light-headedness     Call MD for:  extreme fatigue     Call MD for:   Order Comments: Heavy vaginal bleeding     Activity as tolerated         Follow-up Information     Emmett Gamble MD In 2 weeks.    Specialties:  Obstetrics, Obstetrics and Gynecology  Contact information:  [address] [id number] [phone]                   More Trimble M.D.  PGY-4 ObGyn  [phone]

## 2018-07-27 NOTE — PROGRESS NOTES
Pt served lunch, denies nausea, unable to lift hips, and control legs, reports cramping. Pain relieved with medication given previously.

## 2018-08-02 ENCOUNTER — CLINICAL SUPPORT (OUTPATIENT)
Dept: OBSTETRICS AND GYNECOLOGY | Facility: CLINIC | Age: 23
End: 2018-08-02
Payer: OTHER GOVERNMENT

## 2018-08-02 ENCOUNTER — OFFICE VISIT (OUTPATIENT)
Dept: MATERNAL FETAL MEDICINE | Facility: CLINIC | Age: 23
End: 2018-08-02
Payer: OTHER GOVERNMENT

## 2018-08-02 VITALS
BODY MASS INDEX: 29.11 KG/M2 | SYSTOLIC BLOOD PRESSURE: 100 MMHG | DIASTOLIC BLOOD PRESSURE: 64 MMHG | WEIGHT: 159.19 LBS | TEMPERATURE: 98 F

## 2018-08-02 DIAGNOSIS — Z87.51 HISTORY OF PRETERM DELIVERY: Primary | ICD-10-CM

## 2018-08-02 DIAGNOSIS — O34.31 INCOMPETENT CERVIX IN PREGNANCY, ANTEPARTUM, FIRST TRIMESTER: ICD-10-CM

## 2018-08-02 DIAGNOSIS — O09.891 HISTORY OF PRETERM DELIVERY, CURRENTLY PREGNANT IN FIRST TRIMESTER: ICD-10-CM

## 2018-08-02 PROCEDURE — 99999 PR PBB SHADOW E&M-EST. PATIENT-LVL II: CPT | Mod: PBBFAC,,,

## 2018-08-02 PROCEDURE — 99213 OFFICE O/P EST LOW 20 MIN: CPT | Mod: S$PBB,,, | Performed by: OBSTETRICS & GYNECOLOGY

## 2018-08-02 PROCEDURE — 99213 OFFICE O/P EST LOW 20 MIN: CPT | Mod: PBBFAC,27 | Performed by: OBSTETRICS & GYNECOLOGY

## 2018-08-02 PROCEDURE — 99212 OFFICE O/P EST SF 10 MIN: CPT | Mod: PBBFAC,25

## 2018-08-02 PROCEDURE — 96372 THER/PROPH/DIAG INJ SC/IM: CPT | Mod: PBBFAC

## 2018-08-02 PROCEDURE — 99999 PR PBB SHADOW E&M-EST. PATIENT-LVL III: CPT | Mod: PBBFAC,,, | Performed by: OBSTETRICS & GYNECOLOGY

## 2018-08-02 RX ORDER — GUAIFENESIN 1200 MG
TABLET, EXTENDED RELEASE 12 HR ORAL EVERY 4 HOURS PRN
COMMUNITY

## 2018-08-02 RX ORDER — HYDROXYPROGESTERONE CAPROATE 250 MG/ML
250 INJECTION INTRAMUSCULAR
Status: SHIPPED | OUTPATIENT
Start: 2018-08-02

## 2018-08-02 RX ADMIN — HYDROXYPROGESTERONE CAPROATE 250 MG: 250 INJECTION INTRAMUSCULAR at 09:08

## 2018-08-02 NOTE — LETTER
August 2, 2018      Emmett Gamble MD  [address]  [address]  [facility] [address]           [facility]  [address]  [facility] [address]  Phone: [phone]          Patient: Serge Loera   MR Number: [id number]   YOB: 1995   Date of Visit: 8/2/2018       Dear Dr. Emmett Gamble:    Thank you for referring Serge Loera to me for evaluation. Attached you will find relevant portions of my assessment and plan of care.    If you have questions, please do not hesitate to call me. I look forward to following Serge Loera along with you.    Sincerely,    Susan Mccurdy MD    Enclosure  CC:  No Recipients    If you would like to receive this communication electronically, please contact [email or web address] or [phone] to request more information on [employer] access.    For providers and/or their staff who would like to refer a patient to [facility], please contact us through our one-stop-shop provider referral line, [facility], at [phone].    If you feel you have received this communication in error or would no longer like to receive these types of communications, please e-mail [email or web address]

## 2018-08-02 NOTE — PROGRESS NOTES
Here for hydroxyprogesterone caproate injection (Brittany ) 250 mg/ml. 1 ml./IM . ( RB ) Patient without complaint of pain before or after injection. Advised to wait in lobby 15 minutes after injection. Return in 1 week.     CLINIC SUPPLIED MEDICATION

## 2018-08-02 NOTE — PROGRESS NOTES
F/u visit from cerclage placement on 7/27.  100/64, 98.3 degrees, normal HR, -153 bpm  Reports since cerclage: bad migraines, nausea with emesis, dizziness, myalagias. May have had low grade temp on Sat or Sun. No vaginal discharge. Some pelvic pain.  Re-locating to [place] on 8/10 and will re-establish care ASAP.  On 17OHP.  Exam: CV: RRR, no point tenderness over back. Speculum exam with normal discharge, cerclage visualized. Digital exam with closed posterior cervix. Abdomen non-tender.  Discussed precautions-cramps, LOF, bleeding, foul-smelling discharge. Would recommend cerclage removal at 36 weeks unless indicated earlier. Recommend continuing 17 OHP injections weekly until 36 weeks. Pelvic rest advised and limiting exertional activity advised.  Discussed headaches, may be a component of spinal headache post anesthesia. No vision changes. Try increasing hydration, caffeine, analgesics like tylenol. If worsen, instructed to go to OB ED for evaluation with anesthesia.   The approximate physician face to face time was 15 minutes. The majority of time (greater than 50%) was spent on counseling of the patient or coordination of care.

## 2018-08-06 ENCOUNTER — TELEPHONE (OUTPATIENT)
Dept: OBSTETRICS AND GYNECOLOGY | Facility: CLINIC | Age: 23
End: 2018-08-06

## 2018-08-06 RX ORDER — ONDANSETRON 4 MG/1
4 TABLET, ORALLY DISINTEGRATING ORAL
Qty: 30 TABLET | Refills: 0 | Status: SHIPPED | OUTPATIENT
Start: 2018-08-06

## 2018-08-07 RX ORDER — PROMETHAZINE HYDROCHLORIDE 25 MG/1
25 SUPPOSITORY RECTAL EVERY 6 HOURS PRN
Qty: 12 SUPPOSITORY | Refills: 1 | Status: SHIPPED | OUTPATIENT
Start: 2018-08-07 | End: 2019-08-07

## 2018-08-09 ENCOUNTER — CLINICAL SUPPORT (OUTPATIENT)
Dept: OBSTETRICS AND GYNECOLOGY | Facility: CLINIC | Age: 23
End: 2018-08-09
Payer: OTHER GOVERNMENT

## 2018-08-09 PROCEDURE — 96372 THER/PROPH/DIAG INJ SC/IM: CPT | Mod: PBBFAC

## 2018-08-09 PROCEDURE — 99999 PR PBB SHADOW E&M-EST. PATIENT-LVL II: CPT | Mod: PBBFAC,,,

## 2018-08-09 PROCEDURE — 99212 OFFICE O/P EST SF 10 MIN: CPT | Mod: PBBFAC,25

## 2018-08-09 RX ADMIN — HYDROXYPROGESTERONE CAPROATE 250 MG: 250 INJECTION INTRAMUSCULAR at 09:08

## 2018-08-09 NOTE — PROGRESS NOTES
Here for hydroxyprogesterone caproate injection (Brittany ) 250 mg/ml. 1 ml./IM . ( LB ) Patient without complaint of pain before or after injection. Advised to wait in lobby 15 minutes after injection. Return in 1 week.     CLINIC SUPPLIED MEDICATION

## 2018-08-21 NOTE — OP NOTE
POSTOP DIAGNOSIS:  1. Intrauterine pregnancy at 14 weeks  2. History of cervical incompetence     PROCEDURE:  1. Freed Cerclage     SURGEON: Luis Vitale      ASSISTANT: Carmen López MD      ANESTHESIA: Spinal     COMPLICATIONS: None     EBL: Minimal     FLUIDS: 800     FINDINGS:  Cervix, non-dilated, patulous     INDICATIONS:  Serge Loera is a 23 y.o. female  presents for a history indicated cerclage.  Consents have been signed and reviewed.  Questions have been answered.     PROCEDURE:  Patient was taken to the operating room where spinal anesthesia was administered and found to be adequate.  She was prepped and draped in normal sterile fashion.  A weighted sterile speculum was placed.  The anterior lip of the cervix was grasped with an Allis clamp . A second betadine prep was performed under direct visualization by the surgeon.   Using #1 Ethibon, a suture was placed in a circumferential fashion around the cervix being careful to avoid the bladder and the rectum.  Once the cerclage was placed, the suture was cinched down and tied at the 12 o'clock position.  The result was inspected digitally by the surgeon and found adequate.       All instruments were removed from the vagina.  Sponge, lap, needle and instrument count was correct x 2.  Patient was taken to the recovery room in stable condition.          Carmen TOMLINSON  PGY2

## 2018-09-19 ENCOUNTER — TELEPHONE (OUTPATIENT)
Dept: OBSTETRICS AND GYNECOLOGY | Facility: CLINIC | Age: 23
End: 2018-09-19

## 2018-09-19 NOTE — TELEPHONE ENCOUNTER
----- Message from Miriam Graves sent at 9/19/2018  9:09 AM CDT -----  Contact: Danielle chaparro/[facility]  Name of Who is Calling: Danielle     What is the request in detail: RAYMOND Santos from [facility] in [place] called to obtain any blood work CBC, and HIV results for Serge. She states pt now lives in [place]. Please fax to:    ATTN: RAYMOND Santos   [phone]    Can the clinic reply by [facility]: No      What Number to Call Back if not in [facility]: [phone]

## 2018-09-19 NOTE — TELEPHONE ENCOUNTER
Spoke with Danielle who is requesting blood work results on behalf of pt who has moved to [place].    Danielle was instructed to fax a medical release request to [phone] to have labs sent to [facility].

## 2020-02-16 NOTE — TELEPHONE ENCOUNTER
----- Message from Steffanie Martinez sent at 8/7/2018 10:52 AM CDT -----  Contact: GREGORY NELSON [[id number]]            Name of Who is Calling: GREGORY NELSON [[id number]]      What is the request in detail:patient would like promethazine (PHENERGAN) 25 MG suppository called into pharmacy for nausea states the Zofran does not work. Please call    Can the clinic reply by [facility]: no      What Number to Call Back if not in [facility]ON: [phone]                                 DISPLAY PLAN FREE TEXT

## 2020-04-29 NOTE — TELEPHONE ENCOUNTER
Pt notified of negative Seq 1-NOT AT INCREASED RISK FOR DOWN SYNDROME. Scheduled Aug 2 for f/u cervix s/p cerclage 7/27/18.    ----- Message from Luis Vitale MD sent at 7/19/2018  8:38 AM CDT -----  Inform patient as to normal NT results.  Schedule 20 week anatomy scan.     depression/bipolar affective disorder

## 2021-07-31 NOTE — TRANSFER OF CARE
"Anesthesia Transfer of Care Note    Patient: Serge Loera    Procedure(s) Performed: Procedure(s) (LRB):  CERCLAGE, CERVIX (N/A)    Patient location: PACU    Anesthesia Type: spinal    Transport from OR: Transported from OR on room air with adequate spontaneous ventilation    Post pain: adequate analgesia    Post assessment: no apparent anesthetic complications and tolerated procedure well    Post vital signs: stable    Level of consciousness: awake, alert and oriented    Nausea/Vomiting: no nausea/vomiting    Complications: none          Last vitals:   Visit Vitals  BP (!) 122/59   Pulse 80   Temp 35.8 °C (96.5 °F) (Temporal)   Resp 18   Ht 5' 2" (1.575 m)   Wt 73 kg (161 lb)   LMP 03/29/2018 (Exact Date)   SpO2 100%   Breastfeeding? No   BMI 29.45 kg/m²     "
Admission Reconciliation is Not Complete  Discharge Reconciliation is Completed